# Patient Record
Sex: FEMALE | Race: WHITE | NOT HISPANIC OR LATINO
[De-identification: names, ages, dates, MRNs, and addresses within clinical notes are randomized per-mention and may not be internally consistent; named-entity substitution may affect disease eponyms.]

---

## 2022-06-03 ENCOUNTER — TRANSCRIPTION ENCOUNTER (OUTPATIENT)
Age: 87
End: 2022-06-03

## 2023-10-17 PROBLEM — Z00.00 ENCOUNTER FOR PREVENTIVE HEALTH EXAMINATION: Status: ACTIVE | Noted: 2023-10-17

## 2023-10-19 DIAGNOSIS — I35.0 NONRHEUMATIC AORTIC (VALVE) STENOSIS: ICD-10-CM

## 2023-10-19 DIAGNOSIS — Z86.39 PERSONAL HISTORY OF OTHER ENDOCRINE, NUTRITIONAL AND METABOLIC DISEASE: ICD-10-CM

## 2023-10-20 ENCOUNTER — APPOINTMENT (OUTPATIENT)
Dept: CARDIOTHORACIC SURGERY | Facility: CLINIC | Age: 88
End: 2023-10-20
Payer: MEDICARE

## 2023-10-20 VITALS
DIASTOLIC BLOOD PRESSURE: 76 MMHG | OXYGEN SATURATION: 98 % | WEIGHT: 140 LBS | HEART RATE: 74 BPM | BODY MASS INDEX: 25.76 KG/M2 | RESPIRATION RATE: 17 BRPM | HEIGHT: 62 IN | SYSTOLIC BLOOD PRESSURE: 122 MMHG

## 2023-10-20 PROCEDURE — 99204 OFFICE O/P NEW MOD 45 MIN: CPT

## 2023-11-02 ENCOUNTER — RESULT REVIEW (OUTPATIENT)
Age: 88
End: 2023-11-02

## 2023-12-15 ENCOUNTER — APPOINTMENT (OUTPATIENT)
Dept: CARDIOTHORACIC SURGERY | Facility: CLINIC | Age: 88
End: 2023-12-15
Payer: MEDICARE

## 2023-12-15 VITALS
WEIGHT: 140 LBS | DIASTOLIC BLOOD PRESSURE: 70 MMHG | OXYGEN SATURATION: 96 % | HEART RATE: 60 BPM | BODY MASS INDEX: 25.76 KG/M2 | SYSTOLIC BLOOD PRESSURE: 120 MMHG | HEIGHT: 62 IN

## 2023-12-15 PROCEDURE — 99214 OFFICE O/P EST MOD 30 MIN: CPT

## 2023-12-25 ENCOUNTER — NON-APPOINTMENT (OUTPATIENT)
Age: 88
End: 2023-12-25

## 2023-12-25 NOTE — PHYSICAL EXAM
[Well Developed] : well developed [Well Nourished] : well nourished [No Acute Distress] : no acute distress [Normal Conjunctiva] : normal conjunctiva [Normal Venous Pressure] : normal venous pressure [No Carotid Bruit] : no carotid bruit [No Rub] : no rub [No Gallop] : no gallop [Clear Lung Fields] : clear lung fields [Good Air Entry] : good air entry [No Respiratory Distress] : no respiratory distress  [Soft] : abdomen soft [Non Tender] : non-tender [No Masses/organomegaly] : no masses/organomegaly [Normal Bowel Sounds] : normal bowel sounds [Normal Gait] : normal gait [No Edema] : no edema [No Cyanosis] : no cyanosis [No Clubbing] : no clubbing [No Varicosities] : no varicosities [No Rash] : no rash [No Skin Lesions] : no skin lesions [Moves all extremities] : moves all extremities [No Focal Deficits] : no focal deficits [Normal Speech] : normal speech [Alert and Oriented] : alert and oriented [Normal memory] : normal memory [de-identified] : nml s1, dim s2, 3/6 RACHELE

## 2023-12-25 NOTE — ASSESSMENT
[FreeTextEntry1] : 89 Y female with PHM of HTN, HLD, thoracic aneurysm, OA, HFpEF, mod MR, severe TR and severe aortic stenosis (TTE 5/2023: LVEF 60%, MG 43.30, CRYSTAL 1.0), who presents with BENJAMIN/presyncope. NYHA II. +RLE chronic wound s/p recent course of abx. TTE 5/23 showed moderately dilated RA/RV, nml RV funciton, LVEF 60%, severe AS with peak/mean gradient 85/45mmHg, moderate MR, MAC, severe TR, PASP 75mmHg, mild-moderate PI. Patient is high risk for SAVR. Discussed risks/benefits/alternatives to treat of severe aortic stenosis including medical optimization with progression of symptoms, high risk SAVR, and TAVR with risk of mortality, CVA, vascular complications/bleeding, need for PPM, paravalvular leak, etc. All questions were answered. + hypervascular liver lesions, CAD, tortuous aorta.  -LHC prior to TAVR, +/- PCI potentially staged TAVR; tortuous aorta with plan for [facility] -CT surgical evaluation -redicussion timing with pt/family given recent presyncope understanding  entered into hospice (urgency present given sx) -continue GDMT -MRI abd per PMD -care per referring cardiologist

## 2023-12-25 NOTE — HISTORY OF PRESENT ILLNESS
[FreeTextEntry1] : 89 Y female with PHM of HTN, HLD, thoracic aneurysm, OA, HFpEF, mod MR, severe TR and severe aortic stenosis (TTE 5/2023: LVEF 60%, MG 43.30, CRYSTAL 1.0) who presents for follow up after testing.  Patient reports exertional dyspnea with minimal exertion. Reports recent episode of lightheadedness/dizzyness while sitting at dining room table; denies jory syncope. No prior events. Denies palpitations, chest pain, pnd, orthopnea.  Of note, patient was seen by her PCP, Dr. Duane Betancourt, on 10/3/23 for non-healing RLE wound (trauma 7/23), was started on Keflex and referred to wound care.   TAVR CTs on 11/3/2023- 4.1 cm aortic root aneurysm. 4.1 cm ascending thoracic aortic aneurysm.Severe atherosclerotic calcifications. Markedly tortuous abdominal aorta with two adjacent 90 degree kinks. 1.2 cm vascular malformation in hepatic segment 2 as discussed above. Inaddition, there are several hypervascular foci in hepatic segments 6 and 7 which may represent hypervascular liver lesions versus additional vascularmalformations. A follow-up MRI with hepatic mass protocol/with and without IVcontrast is recommended. Dilated right and left main pulmonary arteries suggesting pulmonary arterial hypertension. Additional findings noted above.  CT Cardiac on 11/3/2023- 1.  Stenosis severity is indeterminate in the proximal to mid LAD, proximalLCX and OM1 and mid RCA2.  Remaining coronary segments appear normal3.  Trileaflet aortic valve with leaflet thickening and calcification  Carotid dopplers on 11/3/2023- IMPRESSION: Calcified plaque without duplex evidence of hemodynamically significant stenosis of either carotid artery.

## 2024-02-01 ENCOUNTER — LABORATORY RESULT (OUTPATIENT)
Age: 89
End: 2024-02-01

## 2024-02-02 LAB
BASOPHILS # BLD AUTO: 0.07 K/UL
BASOPHILS NFR BLD AUTO: 0.8 %
EOSINOPHIL # BLD AUTO: 0.14 K/UL
EOSINOPHIL NFR BLD AUTO: 1.6 %
HCT VFR BLD CALC: 40.4 %
HGB BLD-MCNC: 12.7 G/DL
IMM GRANULOCYTES NFR BLD AUTO: 0.4 %
LYMPHOCYTES # BLD AUTO: 1.13 K/UL
LYMPHOCYTES NFR BLD AUTO: 12.6 %
MAN DIFF?: NORMAL
MCHC RBC-ENTMCNC: 29.7 PG
MCHC RBC-ENTMCNC: 31.4 GM/DL
MCV RBC AUTO: 94.4 FL
MONOCYTES # BLD AUTO: 0.98 K/UL
MONOCYTES NFR BLD AUTO: 10.9 %
NEUTROPHILS # BLD AUTO: 6.64 K/UL
NEUTROPHILS NFR BLD AUTO: 73.7 %
PLATELET # BLD AUTO: 275 K/UL
RBC # BLD: 4.28 M/UL
RBC # FLD: 15.1 %
WBC # FLD AUTO: 9 K/UL

## 2024-02-05 ENCOUNTER — APPOINTMENT (OUTPATIENT)
Dept: CARDIOTHORACIC SURGERY | Facility: CLINIC | Age: 89
End: 2024-02-05
Payer: MEDICARE

## 2024-02-05 PROCEDURE — 99214 OFFICE O/P EST MOD 30 MIN: CPT

## 2024-02-05 PROCEDURE — 99203 OFFICE O/P NEW LOW 30 MIN: CPT

## 2024-02-07 NOTE — REVIEW OF SYSTEMS
[SOB] : shortness of breath [Dyspnea on exertion] : dyspnea during exertion [Negative] : Heme/Lymph [FreeTextEntry5] : syncope

## 2024-02-07 NOTE — HISTORY OF PRESENT ILLNESS
[FreeTextEntry1] :  This visit was provided via telehealth using real-time 2-way audio visual technology. The patient, VERONICA ROQUE, was located at home, [address] [place] [address] [address] [place], [place] [id number] , at the time of the visit. The provider was located at [address] [place] [address]. The patient, VERONICA ROQUE, Dr. Madelyn Scott and SAUL MCKEON all participated in the telehealth encounter. Verbal consent given on 02/05/2024 by VERONICA ROQUE.   89 Y female with PHM of HTN, HLD, thoracic aneurysm, OA, HFpEF, mod MR, severe TR and severe aortic stenosis (TTE 5/2023: LVEF 60%, MG 43.30, CRYSTAL 1.0) referred for surgical consultation of treatment options for severe aortic stenosis.   The patient was last seen by [facility] team on 12/15/2023 when she decided to wait for her procedure until February 2024. She presents for clinical reassessment and surgical consultation. Per her last visit, the patient will need LHC prior to TAVR +/- PCI staged TAVR; tortuous aorta with plan for [facility].  TAVR CTs on 11/3/2023- 4.1 cm aortic root aneurysm. 4.1 cm ascending thoracic aortic aneurysm.Severe atherosclerotic calcifications. Markedly tortuous abdominal aorta with two adjacent 90 degree kinks. 1.2 cm vascular malformation in hepatic segment 2 as discussed above. Inaddition, there are several hypervascular foci in hepatic segments 6 and 7 which may represent hypervascular liver lesions versus additional vascularmalformations. A follow-up MRI with hepatic mass protocol/with and without IVcontrast is recommended. Dilated right and left main pulmonary arteries suggesting pulmonary arterial hypertension. Additional findings noted above.  CT Cardiac on 11/3/2023- 1. Stenosis severity is indeterminate in the proximal to mid LAD, proximalLCX and OM1 and mid RCA2. Remaining coronary segments appear normal3. Trileaflet aortic valve with leaflet thickening and calcification  Carotid dopplers on 11/3/2023- IMPRESSION: Calcified plaque without duplex evidence of hemodynamically significant stenosis of either carotid artery.

## 2024-02-07 NOTE — ASSESSMENT
[FreeTextEntry1] : 89 Y female with PHM of HTN, HLD, thoracic aneurysm, OA, HFpEF, mod MR, severe TR and severe aortic stenosis (TTE 5/2023: LVEF 60%, MG 43.30, CRYSTAL 1.0), recently diagnosed with atrial fibrillation (currently on Xarelto) who presents for surgical consultation.   The patient is clinically stable, NYHA Class III. The procedure, LHC/TF TAVR, was discussed at length again with the patient and she again agrees to proceed.  The tortuosity of her descending aorta discussed in detail.  If difficulty with the valve advancement, will likely do LHC and defer TAVR.   Risk and benefits discussed, all questions answered to the best of my ability.   She was instructed to hold her Xarelto 48 hours prior to procedure.

## 2024-02-07 NOTE — PLAN
[TextEntry] : -Scheduled for LHC/TF TAVR janeen on 02/15/2024, NOAC instructions given to the patient

## 2024-02-09 NOTE — PLAN
[TextEntry] : 1) Scheduled for LHC/TF TAVR on 02/15/2024, NOAC instructions given to the patient   I, Dr. Cale Bowling, personally performed the evaluation and management (E/M) services for this established patient who presents today with (a) new problem(s)/exacerbation of (an) existing condition(s). That E/M includes conducting the clinically appropriate interval history &/or exam, assessing all new/exacerbated conditions, and establishing a new plan of care. Today, my FLORESITA, noted herewith, was here to observe my evaluation and management service for this new problem/exacerbated condition and follow the plan of care established by me going forward.

## 2024-02-09 NOTE — ASSESSMENT
[FreeTextEntry1] : 89 Y female with PHM of HTN, HLD, thoracic aneurysm, OA, HFpEF, mod MR, severe TR and severe aortic stenosis (TTE 5/2023: LVEF 60%, MG 43.30, CRYSTAL 1.0), recently diagnosed with atrial fibrillation (currently on Xarelto) who presents for follow up.  The patient is clinically stable, NYHA Class III. The procedure, LHC/TF TAVR, was discussed at length again with the patient and she again agrees to proceed.  She was instructed to hold her Xarelto 48 hours prior to procedure. She was also evaluated by Dr. Madelyn Scott from [facility] who is in agreement to proceed with LHC/TF TAVR and deemed the patient high risk for SAVR given age, comorbidities, frailty.

## 2024-02-09 NOTE — HISTORY OF PRESENT ILLNESS
[FreeTextEntry1] : This visit was provided via telehealth using real-time 2-way audio visual technology. The patient, VERONICA ROQUE, was located at home, [address] [place] [address] DR [place], [place] [id number] , at the time of the visit. The provider was located at [address] [place] [address]. The patient, VERONICA ROQUE, Dr. Cale Bowling and SAUL MCKEON all participated in the telehealth encounter. Verbal consent given on 02/05/2024 by VERONICA ROQUE.   89 Y female with PHM of HTN, HLD, thoracic aneurysm, OA, HFpEF, mod MR, severe TR and severe aortic stenosis (TTE 5/2023: LVEF 60%, MG 43.30, CRYSTAL 1.0), recently diagnosed with atrial fibrillation (currently on Xarelto) who presents for follow up.  The patient was last seen on 12/15/2023 when she decided to wait for her procedure until February 2024.  She presents for clinical reassessment and surgical consultation.  Per her last visit, the patient will need LHC prior to TAVR +/- PCI staged TAVR; tortuous aorta with plan for [facility].    Patient reports exertional dyspnea with minimal exertion. Reports recent episode of lightheadedness/dizzyness while sitting at dining room table; denies jory syncope. No prior events. Denies palpitations, chest pain, pnd, orthopnea.  Of note, patient was seen by her PCP, Dr. Duane Betancourt, on 10/3/23 for non-healing RLE wound (trauma 7/23), was started on Keflex and referred to wound care.   TAVR CTs on 11/3/2023- 4.1 cm aortic root aneurysm. 4.1 cm ascending thoracic aortic aneurysm.Severe atherosclerotic calcifications. Markedly tortuous abdominal aorta with two adjacent 90 degree kinks. 1.2 cm vascular malformation in hepatic segment 2 as discussed above. Inaddition, there are several hypervascular foci in hepatic segments 6 and 7 which may represent hypervascular liver lesions versus additional vascularmalformations. A follow-up MRI with hepatic mass protocol/with and without IVcontrast is recommended. Dilated right and left main pulmonary arteries suggesting pulmonary arterial hypertension. Additional findings noted above.  CT Cardiac on 11/3/2023- 1.  Stenosis severity is indeterminate in the proximal to mid LAD, proximalLCX and OM1 and mid RCA2.  Remaining coronary segments appear normal3.  Trileaflet aortic valve with leaflet thickening and calcification  Carotid dopplers on 11/3/2023- IMPRESSION: Calcified plaque without duplex evidence of hemodynamically significant stenosis of either carotid artery.   Since her last visit, the patient continues to feel the same with fatigue and dyspnea.  She was recently diagnosed with Atrial Fibrillation on routine EKG with Dr. Mainor Patricia and started on Dilitazem and Xarelto.  The patient states she has had no adverse bleeding events to the Xarelto. She also denies any fever, chills, and denies any open wounds

## 2024-02-13 ENCOUNTER — NON-APPOINTMENT (OUTPATIENT)
Age: 89
End: 2024-02-13

## 2024-02-14 ENCOUNTER — TRANSCRIPTION ENCOUNTER (OUTPATIENT)
Age: 89
End: 2024-02-14

## 2024-02-14 VITALS
TEMPERATURE: 98 F | HEART RATE: 110 BPM | HEIGHT: 61 IN | DIASTOLIC BLOOD PRESSURE: 90 MMHG | RESPIRATION RATE: 18 BRPM | SYSTOLIC BLOOD PRESSURE: 133 MMHG | OXYGEN SATURATION: 97 % | WEIGHT: 136.91 LBS

## 2024-02-14 NOTE — PATIENT PROFILE ADULT - FALL HARM RISK - HARM RISK INTERVENTIONS

## 2024-02-15 ENCOUNTER — RESULT REVIEW (OUTPATIENT)
Age: 89
End: 2024-02-15

## 2024-02-15 ENCOUNTER — TRANSCRIPTION ENCOUNTER (OUTPATIENT)
Age: 89
End: 2024-02-15

## 2024-02-15 ENCOUNTER — APPOINTMENT (OUTPATIENT)
Dept: CARDIOTHORACIC SURGERY | Facility: HOSPITAL | Age: 89
End: 2024-02-15

## 2024-02-15 ENCOUNTER — INPATIENT (INPATIENT)
Facility: HOSPITAL | Age: 89
LOS: 1 days | Discharge: HOME HEALTH | DRG: 267 | End: 2024-02-17
Attending: INTERNAL MEDICINE | Admitting: INTERNAL MEDICINE
Payer: MEDICARE

## 2024-02-15 ENCOUNTER — NON-APPOINTMENT (OUTPATIENT)
Age: 89
End: 2024-02-15

## 2024-02-15 DIAGNOSIS — Z41.9 ENCOUNTER FOR PROCEDURE FOR PURPOSES OTHER THAN REMEDYING HEALTH STATE, UNSPECIFIED: Chronic | ICD-10-CM

## 2024-02-15 LAB
ALBUMIN SERPL ELPH-MCNC: 3.3 G/DL — SIGNIFICANT CHANGE UP (ref 3.3–5)
ALBUMIN SERPL ELPH-MCNC: 4.1 G/DL — SIGNIFICANT CHANGE UP (ref 3.3–5)
ALP SERPL-CCNC: 103 U/L — SIGNIFICANT CHANGE UP (ref 40–120)
ALP SERPL-CCNC: 85 U/L — SIGNIFICANT CHANGE UP (ref 40–120)
ALT FLD-CCNC: 20 U/L — SIGNIFICANT CHANGE UP (ref 10–45)
ALT FLD-CCNC: 24 U/L — SIGNIFICANT CHANGE UP (ref 10–45)
ANION GAP SERPL CALC-SCNC: 12 MMOL/L — SIGNIFICANT CHANGE UP (ref 5–17)
ANION GAP SERPL CALC-SCNC: 6 MMOL/L — SIGNIFICANT CHANGE UP (ref 5–17)
APTT BLD: 31.2 SEC — SIGNIFICANT CHANGE UP (ref 24.5–35.6)
APTT BLD: 33.4 SEC — SIGNIFICANT CHANGE UP (ref 24.5–35.6)
AST SERPL-CCNC: 23 U/L — SIGNIFICANT CHANGE UP (ref 10–40)
AST SERPL-CCNC: 30 U/L — SIGNIFICANT CHANGE UP (ref 10–40)
BILIRUB SERPL-MCNC: 0.6 MG/DL — SIGNIFICANT CHANGE UP (ref 0.2–1.2)
BILIRUB SERPL-MCNC: 0.7 MG/DL — SIGNIFICANT CHANGE UP (ref 0.2–1.2)
BLD GP AB SCN SERPL QL: NEGATIVE — SIGNIFICANT CHANGE UP
BUN SERPL-MCNC: 31 MG/DL — HIGH (ref 7–23)
BUN SERPL-MCNC: 37 MG/DL — HIGH (ref 7–23)
CALCIUM SERPL-MCNC: 8.5 MG/DL — SIGNIFICANT CHANGE UP (ref 8.4–10.5)
CALCIUM SERPL-MCNC: 9.6 MG/DL — SIGNIFICANT CHANGE UP (ref 8.4–10.5)
CHLORIDE SERPL-SCNC: 101 MMOL/L — SIGNIFICANT CHANGE UP (ref 96–108)
CHLORIDE SERPL-SCNC: 108 MMOL/L — SIGNIFICANT CHANGE UP (ref 96–108)
CO2 SERPL-SCNC: 22 MMOL/L — SIGNIFICANT CHANGE UP (ref 22–31)
CO2 SERPL-SCNC: 23 MMOL/L — SIGNIFICANT CHANGE UP (ref 22–31)
CREAT SERPL-MCNC: 0.84 MG/DL — SIGNIFICANT CHANGE UP (ref 0.5–1.3)
CREAT SERPL-MCNC: 0.98 MG/DL — SIGNIFICANT CHANGE UP (ref 0.5–1.3)
EGFR: 54 ML/MIN/1.73M2 — LOW
EGFR: 66 ML/MIN/1.73M2 — SIGNIFICANT CHANGE UP
GAS PNL BLDA: SIGNIFICANT CHANGE UP
GLUCOSE SERPL-MCNC: 105 MG/DL — HIGH (ref 70–99)
GLUCOSE SERPL-MCNC: 118 MG/DL — HIGH (ref 70–99)
HCT VFR BLD CALC: 29.7 % — LOW (ref 34.5–45)
HCT VFR BLD CALC: 36.6 % — SIGNIFICANT CHANGE UP (ref 34.5–45)
HGB BLD-MCNC: 11.9 G/DL — SIGNIFICANT CHANGE UP (ref 11.5–15.5)
HGB BLD-MCNC: 9.8 G/DL — LOW (ref 11.5–15.5)
INR BLD: 1.12 — SIGNIFICANT CHANGE UP (ref 0.85–1.18)
INR BLD: 1.29 — HIGH (ref 0.85–1.18)
MAGNESIUM SERPL-MCNC: 1.9 MG/DL — SIGNIFICANT CHANGE UP (ref 1.6–2.6)
MAGNESIUM SERPL-MCNC: 2.2 MG/DL — SIGNIFICANT CHANGE UP (ref 1.6–2.6)
MCHC RBC-ENTMCNC: 30.4 PG — SIGNIFICANT CHANGE UP (ref 27–34)
MCHC RBC-ENTMCNC: 30.8 PG — SIGNIFICANT CHANGE UP (ref 27–34)
MCHC RBC-ENTMCNC: 32.5 GM/DL — SIGNIFICANT CHANGE UP (ref 32–36)
MCHC RBC-ENTMCNC: 33 GM/DL — SIGNIFICANT CHANGE UP (ref 32–36)
MCV RBC AUTO: 93.4 FL — SIGNIFICANT CHANGE UP (ref 80–100)
MCV RBC AUTO: 93.4 FL — SIGNIFICANT CHANGE UP (ref 80–100)
NRBC # BLD: 0 /100 WBCS — SIGNIFICANT CHANGE UP (ref 0–0)
NRBC # BLD: 0 /100 WBCS — SIGNIFICANT CHANGE UP (ref 0–0)
PHOSPHATE SERPL-MCNC: 2.9 MG/DL — SIGNIFICANT CHANGE UP (ref 2.5–4.5)
PHOSPHATE SERPL-MCNC: 3.4 MG/DL — SIGNIFICANT CHANGE UP (ref 2.5–4.5)
PLATELET # BLD AUTO: 202 K/UL — SIGNIFICANT CHANGE UP (ref 150–400)
PLATELET # BLD AUTO: 278 K/UL — SIGNIFICANT CHANGE UP (ref 150–400)
POTASSIUM SERPL-MCNC: 3.9 MMOL/L — SIGNIFICANT CHANGE UP (ref 3.5–5.3)
POTASSIUM SERPL-MCNC: 4.2 MMOL/L — SIGNIFICANT CHANGE UP (ref 3.5–5.3)
POTASSIUM SERPL-SCNC: 3.9 MMOL/L — SIGNIFICANT CHANGE UP (ref 3.5–5.3)
POTASSIUM SERPL-SCNC: 4.2 MMOL/L — SIGNIFICANT CHANGE UP (ref 3.5–5.3)
PROT SERPL-MCNC: 5.6 G/DL — LOW (ref 6–8.3)
PROT SERPL-MCNC: 7.1 G/DL — SIGNIFICANT CHANGE UP (ref 6–8.3)
PROTHROM AB SERPL-ACNC: 12.7 SEC — SIGNIFICANT CHANGE UP (ref 9.5–13)
PROTHROM AB SERPL-ACNC: 14.6 SEC — HIGH (ref 9.5–13)
RBC # BLD: 3.18 M/UL — LOW (ref 3.8–5.2)
RBC # BLD: 3.92 M/UL — SIGNIFICANT CHANGE UP (ref 3.8–5.2)
RBC # FLD: 14.6 % — HIGH (ref 10.3–14.5)
RBC # FLD: 14.8 % — HIGH (ref 10.3–14.5)
RH IG SCN BLD-IMP: POSITIVE — SIGNIFICANT CHANGE UP
SODIUM SERPL-SCNC: 136 MMOL/L — SIGNIFICANT CHANGE UP (ref 135–145)
SODIUM SERPL-SCNC: 136 MMOL/L — SIGNIFICANT CHANGE UP (ref 135–145)
WBC # BLD: 5.62 K/UL — SIGNIFICANT CHANGE UP (ref 3.8–10.5)
WBC # BLD: 6.58 K/UL — SIGNIFICANT CHANGE UP (ref 3.8–10.5)
WBC # FLD AUTO: 5.62 K/UL — SIGNIFICANT CHANGE UP (ref 3.8–10.5)
WBC # FLD AUTO: 6.58 K/UL — SIGNIFICANT CHANGE UP (ref 3.8–10.5)

## 2024-02-15 PROCEDURE — 93306 TTE W/DOPPLER COMPLETE: CPT | Mod: 26

## 2024-02-15 PROCEDURE — 33361 REPLACE AORTIC VALVE PERQ: CPT | Mod: Q0,62

## 2024-02-15 PROCEDURE — 71045 X-RAY EXAM CHEST 1 VIEW: CPT | Mod: 26

## 2024-02-15 PROCEDURE — 93308 TTE F-UP OR LMTD: CPT | Mod: 26

## 2024-02-15 DEVICE — SHEATH INTRO DRYSEAL FLEX 14FR 33CM: Type: IMPLANTABLE DEVICE | Status: FUNCTIONAL

## 2024-02-15 DEVICE — SYS LOAD NAVITOR FLEXNAV LRG: Type: IMPLANTABLE DEVICE | Status: FUNCTIONAL

## 2024-02-15 DEVICE — BLLN Z-MED II 23MMX4X100CM: Type: IMPLANTABLE DEVICE | Status: FUNCTIONAL

## 2024-02-15 DEVICE — SUT PERCLOSE PROGLIDE 6FR: Type: IMPLANTABLE DEVICE | Status: FUNCTIONAL

## 2024-02-15 DEVICE — GWIRE ROSEN 0.035INX260CM: Type: IMPLANTABLE DEVICE | Status: FUNCTIONAL

## 2024-02-15 DEVICE — INTRO FLEXOR CHECK RAABE 8FR X 55CM: Type: IMPLANTABLE DEVICE | Status: FUNCTIONAL

## 2024-02-15 DEVICE — INTRODUCER SHEATH KIT GLIDESHEATH SLENDER FLEX STRAIGHT 21G 6F X 10CM: Type: IMPLANTABLE DEVICE | Status: FUNCTIONAL

## 2024-02-15 DEVICE — PACING CATH PACEL RIGHT HEART CURVE 5FR: Type: IMPLANTABLE DEVICE | Status: FUNCTIONAL

## 2024-02-15 DEVICE — CATH DX AL1 INFIN 5FRX100CM: Type: IMPLANTABLE DEVICE | Status: FUNCTIONAL

## 2024-02-15 DEVICE — SYS DEL NAVITOR FLEXNAV LRG: Type: IMPLANTABLE DEVICE | Status: FUNCTIONAL

## 2024-02-15 DEVICE — WIRE GD BMW UNIV II 300CM: Type: IMPLANTABLE DEVICE | Status: FUNCTIONAL

## 2024-02-15 DEVICE — WIRE GD CONFIDA BRECKER CRV .035X260: Type: IMPLANTABLE DEVICE | Status: FUNCTIONAL

## 2024-02-15 DEVICE — GUIDEWIRE TERUMO GLIDEWIRE STIFF STRAGHT .035" X 180CM: Type: IMPLANTABLE DEVICE | Status: FUNCTIONAL

## 2024-02-15 DEVICE — GWIRE JTIP 1.5MM .035X180CM: Type: IMPLANTABLE DEVICE | Status: FUNCTIONAL

## 2024-02-15 DEVICE — ANGIOSEAL VASC CLOS VIP 8FR: Type: IMPLANTABLE DEVICE | Status: FUNCTIONAL

## 2024-02-15 DEVICE — GUIDEWIRE LUNDERQUIST EXTRA-STIFF DOUBLE CURVED .035" X 260CM: Type: IMPLANTABLE DEVICE | Status: FUNCTIONAL

## 2024-02-15 DEVICE — SHEATH INTRODUCER TERUMO PINNACLE GUIDING 6FR X 45CM CROSS-CUT STRAIGHT: Type: IMPLANTABLE DEVICE | Status: FUNCTIONAL

## 2024-02-15 DEVICE — GWIRE GUID  0.035INX150CM: Type: IMPLANTABLE DEVICE | Status: FUNCTIONAL

## 2024-02-15 DEVICE — KIT PACE ELCTR BIPOLAR 5FR: Type: IMPLANTABLE DEVICE | Status: FUNCTIONAL

## 2024-02-15 DEVICE — BLLN Z-MED II 20MMX4X100CM: Type: IMPLANTABLE DEVICE | Status: FUNCTIONAL

## 2024-02-15 DEVICE — IMPLANTABLE DEVICE: Type: IMPLANTABLE DEVICE | Status: FUNCTIONAL

## 2024-02-15 DEVICE — CATH DX PIG 145 INFIN 5FRX110CM: Type: IMPLANTABLE DEVICE | Status: FUNCTIONAL

## 2024-02-15 DEVICE — CATH ANGIO GLIDECATH ANGLE 4FR X 120CM: Type: IMPLANTABLE DEVICE | Status: FUNCTIONAL

## 2024-02-15 DEVICE — GUIDEWIRE RADIFOCUS GLIDEWIRE ANGLED 0.035" X 260CM STIFF: Type: IMPLANTABLE DEVICE | Status: FUNCTIONAL

## 2024-02-15 DEVICE — CATH  INFINITI 5FR MULTIPACK: Type: IMPLANTABLE DEVICE | Status: FUNCTIONAL

## 2024-02-15 DEVICE — INTRO MICROPUNC 4FRX10CM SS: Type: IMPLANTABLE DEVICE | Status: FUNCTIONAL

## 2024-02-15 DEVICE — SHEATH INTRODUCER TERUMO PINNACLE CORONARY 8FR X 10CM X 0.038" MINI WIRE: Type: IMPLANTABLE DEVICE | Status: FUNCTIONAL

## 2024-02-15 DEVICE — CATH DX JL5 INFIN 5FRX100CM: Type: IMPLANTABLE DEVICE | Status: FUNCTIONAL

## 2024-02-15 RX ORDER — HEPARIN SODIUM 5000 [USP'U]/ML
800 INJECTION INTRAVENOUS; SUBCUTANEOUS
Qty: 25000 | Refills: 0 | Status: DISCONTINUED | OUTPATIENT
Start: 2024-02-15 | End: 2024-02-16

## 2024-02-15 RX ORDER — CEFAZOLIN SODIUM 1 G
2000 VIAL (EA) INJECTION EVERY 8 HOURS
Refills: 0 | Status: COMPLETED | OUTPATIENT
Start: 2024-02-15 | End: 2024-02-17

## 2024-02-15 RX ORDER — MORPHINE SULFATE 50 MG/1
2 CAPSULE, EXTENDED RELEASE ORAL ONCE
Refills: 0 | Status: DISCONTINUED | OUTPATIENT
Start: 2024-02-15 | End: 2024-02-15

## 2024-02-15 RX ORDER — PHENYLEPHRINE HYDROCHLORIDE 10 MG/ML
0.5 INJECTION INTRAVENOUS
Qty: 40 | Refills: 0 | Status: DISCONTINUED | OUTPATIENT
Start: 2024-02-15 | End: 2024-02-16

## 2024-02-15 RX ORDER — HEPARIN SODIUM 5000 [USP'U]/ML
5000 INJECTION INTRAVENOUS; SUBCUTANEOUS EVERY 8 HOURS
Refills: 0 | Status: DISCONTINUED | OUTPATIENT
Start: 2024-02-15 | End: 2024-02-15

## 2024-02-15 RX ORDER — LANOLIN ALCOHOL/MO/W.PET/CERES
5 CREAM (GRAM) TOPICAL AT BEDTIME
Refills: 0 | Status: DISCONTINUED | OUTPATIENT
Start: 2024-02-15 | End: 2024-02-17

## 2024-02-15 RX ORDER — ALBUMIN HUMAN 25 %
250 VIAL (ML) INTRAVENOUS ONCE
Refills: 0 | Status: COMPLETED | OUTPATIENT
Start: 2024-02-15 | End: 2024-02-15

## 2024-02-15 RX ORDER — OXYCODONE HYDROCHLORIDE 5 MG/1
5 TABLET ORAL EVERY 6 HOURS
Refills: 0 | Status: DISCONTINUED | OUTPATIENT
Start: 2024-02-15 | End: 2024-02-16

## 2024-02-15 RX ORDER — MAGNESIUM SULFATE 500 MG/ML
2 VIAL (ML) INJECTION ONCE
Refills: 0 | Status: COMPLETED | OUTPATIENT
Start: 2024-02-15 | End: 2024-02-15

## 2024-02-15 RX ORDER — ACETAMINOPHEN 500 MG
650 TABLET ORAL EVERY 6 HOURS
Refills: 0 | Status: DISCONTINUED | OUTPATIENT
Start: 2024-02-15 | End: 2024-02-17

## 2024-02-15 RX ORDER — PANTOPRAZOLE SODIUM 20 MG/1
40 TABLET, DELAYED RELEASE ORAL
Refills: 0 | Status: DISCONTINUED | OUTPATIENT
Start: 2024-02-15 | End: 2024-02-17

## 2024-02-15 RX ORDER — POLYETHYLENE GLYCOL 3350 17 G/17G
17 POWDER, FOR SOLUTION ORAL DAILY
Refills: 0 | Status: DISCONTINUED | OUTPATIENT
Start: 2024-02-15 | End: 2024-02-17

## 2024-02-15 RX ORDER — ATORVASTATIN CALCIUM 80 MG/1
10 TABLET, FILM COATED ORAL AT BEDTIME
Refills: 0 | Status: DISCONTINUED | OUTPATIENT
Start: 2024-02-15 | End: 2024-02-17

## 2024-02-15 RX ORDER — ACETAMINOPHEN 500 MG
1000 TABLET ORAL ONCE
Refills: 0 | Status: COMPLETED | OUTPATIENT
Start: 2024-02-15 | End: 2024-02-15

## 2024-02-15 RX ORDER — SENNA PLUS 8.6 MG/1
2 TABLET ORAL AT BEDTIME
Refills: 0 | Status: DISCONTINUED | OUTPATIENT
Start: 2024-02-15 | End: 2024-02-17

## 2024-02-15 RX ORDER — SODIUM CHLORIDE 9 MG/ML
500 INJECTION, SOLUTION INTRAVENOUS ONCE
Refills: 0 | Status: COMPLETED | OUTPATIENT
Start: 2024-02-15 | End: 2024-02-15

## 2024-02-15 RX ORDER — ASPIRIN/CALCIUM CARB/MAGNESIUM 324 MG
81 TABLET ORAL ONCE
Refills: 0 | Status: COMPLETED | OUTPATIENT
Start: 2024-02-15 | End: 2024-02-15

## 2024-02-15 RX ADMIN — SENNA PLUS 2 TABLET(S): 8.6 TABLET ORAL at 22:00

## 2024-02-15 RX ADMIN — Medication 25 GRAM(S): at 14:51

## 2024-02-15 RX ADMIN — Medication 1000 MILLIGRAM(S): at 12:00

## 2024-02-15 RX ADMIN — SODIUM CHLORIDE 1000 MILLILITER(S): 9 INJECTION, SOLUTION INTRAVENOUS at 18:30

## 2024-02-15 RX ADMIN — HEPARIN SODIUM 8 UNIT(S)/HR: 5000 INJECTION INTRAVENOUS; SUBCUTANEOUS at 22:01

## 2024-02-15 RX ADMIN — PANTOPRAZOLE SODIUM 40 MILLIGRAM(S): 20 TABLET, DELAYED RELEASE ORAL at 22:00

## 2024-02-15 RX ADMIN — Medication 100 MILLIGRAM(S): at 19:34

## 2024-02-15 RX ADMIN — PHENYLEPHRINE HYDROCHLORIDE 11.6 MICROGRAM(S)/KG/MIN: 10 INJECTION INTRAVENOUS at 13:13

## 2024-02-15 RX ADMIN — Medication 81 MILLIGRAM(S): at 07:37

## 2024-02-15 RX ADMIN — ATORVASTATIN CALCIUM 10 MILLIGRAM(S): 80 TABLET, FILM COATED ORAL at 22:00

## 2024-02-15 RX ADMIN — Medication 500 MILLILITER(S): at 20:00

## 2024-02-15 RX ADMIN — Medication 400 MILLIGRAM(S): at 11:30

## 2024-02-15 RX ADMIN — MORPHINE SULFATE 2 MILLIGRAM(S): 50 CAPSULE, EXTENDED RELEASE ORAL at 12:00

## 2024-02-15 RX ADMIN — OXYCODONE HYDROCHLORIDE 5 MILLIGRAM(S): 5 TABLET ORAL at 15:30

## 2024-02-15 RX ADMIN — MORPHINE SULFATE 2 MILLIGRAM(S): 50 CAPSULE, EXTENDED RELEASE ORAL at 12:15

## 2024-02-15 RX ADMIN — OXYCODONE HYDROCHLORIDE 5 MILLIGRAM(S): 5 TABLET ORAL at 14:51

## 2024-02-15 NOTE — CHART NOTE - NSCHARTNOTEFT_GEN_A_CORE
Access: Left groin 14 F. Perc closed x 2.  Angioseal x 1  Rt radial, 2nd access.  Remove ~3pm  TVP: Rt groin  Pre-existing rhythm issues:   Afib  Intra-op rhythm issues: Afib and wide QRS  Post-op rhythm issues:  Afib  TR Band: 3pm removal pending coags  Groin sites: Stable  Bed rest: keep bed rest for groin line  Anti-platelet: Eliquis tomorrow  Dispo:  Home candidate  TTE/EKG ordered  F/U post op labs/CXR pending

## 2024-02-15 NOTE — BRIEF OPERATIVE NOTE - COMMENTS
Dr. Scott was the first assistant for this case including but not limited to  obtaining access, LHC, and deployment of aortic valve     I was present for this procedure and participated as first assistant as described by the PA above, unless otherwise noted below.

## 2024-02-15 NOTE — H&P ADULT - ASSESSMENT
Assessment:    89 Y female with PHM of HTN, HLD, thoracic aneurysm, OA, HFpEF, mod MR, severe TR and severe aortic stenosis (TTE 5/2023: LVEF 60%, MG 43.30, CRYSTAL 1.0) referred for surgical consultation of treatment options for severe aortic stenosis. Ultimately deemed good candidate for TAVR.     Plan:  - Case discussed with Dr. Bowling  - Plan to proceed with TAVR today   - Post op EKG, labs, CXR, and TTE  - Resume home meds as indicated  - Admit under Dr. Bowling via same day surgery. Consent signed, placed on chart.  Risks/benefits reviewed, patient understands and agrees. T&S ordered and blood products placed on hold for OR.  To 9lach  post-op.   Assessment:    89 Y female with PHM of newly diagnosed AF (on eliquis but experiencing nose bleeds), HTN, HLD, thoracic aneurysm, OA, HFpEF, mod MR, severe TR and severe aortic stenosis (TTE 5/2023: LVEF 60%, MG 43.30, CRYSTAL 1.0) referred for surgical consultation of treatment options for severe aortic stenosis. Ultimately deemed good candidate for TAVR.     Plan:  - Case discussed with Dr. Bowling  - Plan to proceed with TAVR today   - Post op EKG, labs, CXR, and TTE  - Resume home meds as indicated  - Admit under Dr. Bowling via same day surgery. Consent signed, placed on chart.  Risks/benefits reviewed, patient understands and agrees. T&S ordered and blood products placed on hold for OR.  To 9lach  post-op.

## 2024-02-15 NOTE — PRE-ANESTHESIA EVALUATION ADULT - NSANTHOSAYNRD_GEN_A_CORE
No. KARYN screening performed.  STOP BANG Legend: 0-2 = LOW Risk; 3-4 = INTERMEDIATE Risk; 5-8 = HIGH Risk

## 2024-02-15 NOTE — H&P ADULT - HISTORY OF PRESENT ILLNESS
89 Y female with PHM of HTN, HLD, thoracic aneurysm, OA, HFpEF, mod MR, severe TR and severe aortic stenosis (TTE 5/2023: LVEF 60%, MG 43.30, CRYSTAL 1.0) referred for surgical consultation of treatment options for severe aortic stenosis. Ultimately deemed good candidate for TAVR.     The patient was last seen by [facility] team on 12/15/2023 when she decided to wait for her procedure until February 2024. She presents for clinical reassessment and surgical consultation. Per her last visit, the patient will need LHC prior to TAVR +/- PCI staged TAVR; tortuous aorta with plan for [facility].    TAVR CTs on 11/3/2023- 4.1 cm aortic root aneurysm. 4.1 cm ascending thoracic aortic aneurysm.Severe atherosclerotic calcifications. Markedly tortuous abdominal aorta with two adjacent 90 degree kinks. 1.2 cm vascular malformation in hepatic segment 2 as discussed above. Inaddition, there are several hypervascular foci in hepatic segments 6 and 7 which may represent hypervascular liver lesions versus additional vascularmalformations. A follow-up MRI with hepatic mass protocol/with and without IVcontrast is recommended. Dilated right and left main pulmonary arteries suggesting pulmonary arterial hypertension. Additional findings noted above.    CT Cardiac on 11/3/2023- 1. Stenosis severity is indeterminate in the proximal to mid LAD, proximalLCX and OM1 and mid RCA2. Remaining coronary segments appear normal3. Trileaflet aortic valve with leaflet thickening and calcification    Carotid dopplers on 11/3/2023- IMPRESSION: Calcified plaque without duplex evidence of hemodynamically significant stenosis of either carotid artery.     Patient seen in same day holding area; Reports no changes to PMHx or medications since last seen by our team. Denies acute or current SOB, chest pain, palpitation, N/V/D, fever/chills, recent illness, or any other concerning symptoms.   89 Y female with PHM of newly diagnosed AF (on eliquis but experiencing nose bleeds), HTN, HLD, thoracic aneurysm, OA, HFpEF, mod MR, severe TR and severe aortic stenosis (TTE 5/2023: LVEF 60%, MG 43.30, CRYSTAL 1.0) referred for surgical consultation of treatment options for severe aortic stenosis. Ultimately deemed good candidate for TAVR.     The patient was last seen by [facility] team on 12/15/2023 when she decided to wait for her procedure until February 2024. She presents for clinical reassessment and surgical consultation. Per her last visit, the patient will need LHC prior to TAVR +/- PCI staged TAVR; tortuous aorta with plan for [facility].    TAVR CTs on 11/3/2023- 4.1 cm aortic root aneurysm. 4.1 cm ascending thoracic aortic aneurysm.Severe atherosclerotic calcifications. Markedly tortuous abdominal aorta with two adjacent 90 degree kinks. 1.2 cm vascular malformation in hepatic segment 2 as discussed above. Inaddition, there are several hypervascular foci in hepatic segments 6 and 7 which may represent hypervascular liver lesions versus additional vascularmalformations. A follow-up MRI with hepatic mass protocol/with and without IVcontrast is recommended. Dilated right and left main pulmonary arteries suggesting pulmonary arterial hypertension. Additional findings noted above.    CT Cardiac on 11/3/2023- 1. Stenosis severity is indeterminate in the proximal to mid LAD, proximalLCX and OM1 and mid RCA2. Remaining coronary segments appear normal3. Trileaflet aortic valve with leaflet thickening and calcification    Carotid dopplers on 11/3/2023- IMPRESSION: Calcified plaque without duplex evidence of hemodynamically significant stenosis of either carotid artery.     Patient seen in same day holding area; Reports no changes to PMHx or medications since last seen by our team. Denies acute or current SOB, chest pain, palpitation, N/V/D, fever/chills, recent illness, or any other concerning symptoms.

## 2024-02-15 NOTE — BRIEF OPERATIVE NOTE - OPERATION/FINDINGS
27 mm Abbott[employer]i valve   Rhythm: NSR, intermittent AF    Access:  Right femoral artery - 14 F perc close x2 and angioseal  Left femoral artery - 6 F - perc close x1  Left femoral vein - 6 f - manual pressure 27 mm AbbottNavi valve   Rhythm: NSR, intermittent AF    Access:  Left femoral artery - 14 F perc close x2 and angioseal  Right femoral vein - 6 F - TVP in place  RRA 6 Fr- TR band

## 2024-02-15 NOTE — H&P ADULT - NSICDXPASTMEDICALHX_GEN_ALL_CORE_FT
PAST MEDICAL HISTORY:  (HFpEF) heart failure with preserved ejection fraction     Aneurysm, thoracic aortic     Aortic stenosis, severe     Fibrillation, atrial     HLD (hyperlipidemia)     HTN (hypertension)     Mitral regurgitation moderate    Severe tricuspid valve regurgitation

## 2024-02-15 NOTE — BRIEF OPERATIVE NOTE - NSICDXBRIEFPROCEDURE_GEN_ALL_CORE_FT
PROCEDURES:  TAVR, percutaneous 15-Feb-2024 10:26:28 (27 mm John valve), [facility], Ef 60% Maryam Chávez

## 2024-02-15 NOTE — H&P ADULT - NSHPREVIEWOFSYSTEMS_GEN_ALL_CORE
Review of Systems  CONSTITUTIONAL:  Denies fevers / chills, sweats, fatigue, weight loss, weight gain                                      NEURO:  Denies paresthesias, seizures, syncope, confusion                                                                                EYES:  Denies blurry vision, discharge, pain, loss of vision                                                                                    ENMT:  Denies difficulty hearing, vertigo, dysphagia, epistaxis, recent dental work                                       CV:  Denies chest pain, palpitations, BENJAMIN, orthopnea                                                                                          RESPIRATORY:  Denies wheezing, SOB, cough / sputum, hemoptysis                                                                GI:  Denies nausea, vomiting, diarrhea, constipation, melena, difficulty swallowing                                             : Denies hematuria, dysuria, urgency, incontinence                                                                                         MUSCULOSKELETAL:  Denies arthritis, joint swelling, muscle weakness                                                             SKIN/BREAST:  Denies rash, itching, hair loss, masses                                                                                            PSYCH:  Denies depression, anxiety, suicidal ideation                                                                                               HEME/LYMPH:  Denies bruises easily, enlarged lymph nodes, tender lymph nodes                                        ENDOCRINE:  Denies cold intolerance, heat intolerance, polydipsia

## 2024-02-15 NOTE — H&P ADULT - NSHPSOCIALHISTORY_GEN_ALL_CORE
Social History  Smoker:   YES / NO       Pack Years:       When Quit:  ETOH Use:   YES / NO   Frequency / Quantity:  Ilicit Drug Use:   YES / NO  Occupation:  Assistant Devices:   None / Walker / Cane  Lives with: Social History  Smoker:   NO       ETOH Use:   NO     Ilicit Drug Use:   NO  Occupation: N/A  Assistant Devices:   None   Lives with: Alone

## 2024-02-15 NOTE — H&P ADULT - NSHPPHYSICALEXAM_GEN_ALL_CORE
Physical Exam  CONSTITUTIONAL:                                                              WNL  NEURO:                                                                       WNL                      EYES:                                                                                WNL  ENMT:                                                                               WNL  CV:                                                                                   WNL  RESPIRATORY:                                                                 WNL  GI:                                                                                     WNL  : MARTINES + / -                                                                  WNL  MUSKULOSKELETAL:                                                       WNL  SKIN / BREAST:                                                                  WNL Physical Exam  CONSTITUTIONAL: well appearing, NAD  NEURO:  A&OX3, no focal deficits                   EYES: PERRLA  ENMT: Neck supple   CV: RRR, +murmur, no rubs/ gallops   RESPIRATORY:  CTA bilateral posterior lung fields   GI: +BS, NT/ND  : No sim   MUSKULOSKELETAL: +mild LE edema  SKIN / BREAST: No rashes/ abrasions noted

## 2024-02-16 ENCOUNTER — RESULT REVIEW (OUTPATIENT)
Age: 89
End: 2024-02-16

## 2024-02-16 ENCOUNTER — TRANSCRIPTION ENCOUNTER (OUTPATIENT)
Age: 89
End: 2024-02-16

## 2024-02-16 LAB
ALBUMIN SERPL ELPH-MCNC: 3.4 G/DL — SIGNIFICANT CHANGE UP (ref 3.3–5)
ALP SERPL-CCNC: 85 U/L — SIGNIFICANT CHANGE UP (ref 40–120)
ALT FLD-CCNC: 21 U/L — SIGNIFICANT CHANGE UP (ref 10–45)
ANION GAP SERPL CALC-SCNC: 9 MMOL/L — SIGNIFICANT CHANGE UP (ref 5–17)
APTT BLD: 55.4 SEC — HIGH (ref 24.5–35.6)
APTT BLD: 67.4 SEC — HIGH (ref 24.5–35.6)
APTT BLD: 69.5 SEC — HIGH (ref 24.5–35.6)
AST SERPL-CCNC: 29 U/L — SIGNIFICANT CHANGE UP (ref 10–40)
BILIRUB SERPL-MCNC: 0.6 MG/DL — SIGNIFICANT CHANGE UP (ref 0.2–1.2)
BUN SERPL-MCNC: 26 MG/DL — HIGH (ref 7–23)
CALCIUM SERPL-MCNC: 8.8 MG/DL — SIGNIFICANT CHANGE UP (ref 8.4–10.5)
CHLORIDE SERPL-SCNC: 104 MMOL/L — SIGNIFICANT CHANGE UP (ref 96–108)
CO2 SERPL-SCNC: 21 MMOL/L — LOW (ref 22–31)
CREAT SERPL-MCNC: 0.79 MG/DL — SIGNIFICANT CHANGE UP (ref 0.5–1.3)
EGFR: 71 ML/MIN/1.73M2 — SIGNIFICANT CHANGE UP
GAS PNL BLDA: SIGNIFICANT CHANGE UP
GLUCOSE SERPL-MCNC: 97 MG/DL — SIGNIFICANT CHANGE UP (ref 70–99)
HCT VFR BLD CALC: 30.3 % — LOW (ref 34.5–45)
HGB BLD-MCNC: 9.8 G/DL — LOW (ref 11.5–15.5)
INR BLD: 1.2 — HIGH (ref 0.85–1.18)
MAGNESIUM SERPL-MCNC: 2.3 MG/DL — SIGNIFICANT CHANGE UP (ref 1.6–2.6)
MCHC RBC-ENTMCNC: 30.4 PG — SIGNIFICANT CHANGE UP (ref 27–34)
MCHC RBC-ENTMCNC: 32.3 GM/DL — SIGNIFICANT CHANGE UP (ref 32–36)
MCV RBC AUTO: 94.1 FL — SIGNIFICANT CHANGE UP (ref 80–100)
NRBC # BLD: 0 /100 WBCS — SIGNIFICANT CHANGE UP (ref 0–0)
PHOSPHATE SERPL-MCNC: 2.8 MG/DL — SIGNIFICANT CHANGE UP (ref 2.5–4.5)
PLATELET # BLD AUTO: 186 K/UL — SIGNIFICANT CHANGE UP (ref 150–400)
POTASSIUM SERPL-MCNC: 4.3 MMOL/L — SIGNIFICANT CHANGE UP (ref 3.5–5.3)
POTASSIUM SERPL-SCNC: 4.3 MMOL/L — SIGNIFICANT CHANGE UP (ref 3.5–5.3)
PROT SERPL-MCNC: 5.4 G/DL — LOW (ref 6–8.3)
PROTHROM AB SERPL-ACNC: 13.6 SEC — HIGH (ref 9.5–13)
RBC # BLD: 3.22 M/UL — LOW (ref 3.8–5.2)
RBC # FLD: 14.6 % — HIGH (ref 10.3–14.5)
SODIUM SERPL-SCNC: 134 MMOL/L — LOW (ref 135–145)
WBC # BLD: 5.52 K/UL — SIGNIFICANT CHANGE UP (ref 3.8–10.5)
WBC # FLD AUTO: 5.52 K/UL — SIGNIFICANT CHANGE UP (ref 3.8–10.5)

## 2024-02-16 PROCEDURE — 93010 ELECTROCARDIOGRAM REPORT: CPT

## 2024-02-16 PROCEDURE — 99232 SBSQ HOSP IP/OBS MODERATE 35: CPT

## 2024-02-16 PROCEDURE — 93308 TTE F-UP OR LMTD: CPT | Mod: 26

## 2024-02-16 PROCEDURE — 71045 X-RAY EXAM CHEST 1 VIEW: CPT | Mod: 26

## 2024-02-16 RX ORDER — DILTIAZEM HCL 120 MG
30 CAPSULE, EXT RELEASE 24 HR ORAL EVERY 6 HOURS
Refills: 0 | Status: DISCONTINUED | OUTPATIENT
Start: 2024-02-16 | End: 2024-02-17

## 2024-02-16 RX ORDER — APIXABAN 2.5 MG/1
2.5 TABLET, FILM COATED ORAL EVERY 12 HOURS
Refills: 0 | Status: DISCONTINUED | OUTPATIENT
Start: 2024-02-16 | End: 2024-02-17

## 2024-02-16 RX ORDER — SODIUM CHLORIDE 9 MG/ML
3 INJECTION INTRAMUSCULAR; INTRAVENOUS; SUBCUTANEOUS EVERY 8 HOURS
Refills: 0 | Status: DISCONTINUED | OUTPATIENT
Start: 2024-02-16 | End: 2024-02-17

## 2024-02-16 RX ADMIN — Medication 650 MILLIGRAM(S): at 20:05

## 2024-02-16 RX ADMIN — Medication 100 MILLIGRAM(S): at 09:48

## 2024-02-16 RX ADMIN — POLYETHYLENE GLYCOL 3350 17 GRAM(S): 17 POWDER, FOR SOLUTION ORAL at 16:26

## 2024-02-16 RX ADMIN — APIXABAN 2.5 MILLIGRAM(S): 2.5 TABLET, FILM COATED ORAL at 18:30

## 2024-02-16 RX ADMIN — SENNA PLUS 2 TABLET(S): 8.6 TABLET ORAL at 21:32

## 2024-02-16 RX ADMIN — PANTOPRAZOLE SODIUM 40 MILLIGRAM(S): 20 TABLET, DELAYED RELEASE ORAL at 06:32

## 2024-02-16 RX ADMIN — Medication 650 MILLIGRAM(S): at 09:30

## 2024-02-16 RX ADMIN — Medication 30 MILLIGRAM(S): at 23:36

## 2024-02-16 RX ADMIN — ATORVASTATIN CALCIUM 10 MILLIGRAM(S): 80 TABLET, FILM COATED ORAL at 21:33

## 2024-02-16 RX ADMIN — Medication 30 MILLIGRAM(S): at 08:32

## 2024-02-16 RX ADMIN — Medication 650 MILLIGRAM(S): at 08:28

## 2024-02-16 RX ADMIN — Medication 30 MILLIGRAM(S): at 18:30

## 2024-02-16 RX ADMIN — Medication 100 MILLIGRAM(S): at 02:51

## 2024-02-16 RX ADMIN — Medication 650 MILLIGRAM(S): at 00:52

## 2024-02-16 RX ADMIN — Medication 650 MILLIGRAM(S): at 02:30

## 2024-02-16 RX ADMIN — Medication 100 MILLIGRAM(S): at 18:30

## 2024-02-16 RX ADMIN — Medication 30 MILLIGRAM(S): at 12:46

## 2024-02-16 NOTE — DISCHARGE NOTE PROVIDER - NSDCCPTREATMENT_GEN_ALL_CORE_FT
PRINCIPAL PROCEDURE  Procedure: TAVR, percutaneous  Findings and Treatment: (27 mm John valve), LHC, Ef 60%     PRINCIPAL PROCEDURE  Procedure: TAVR, percutaneous  Findings and Treatment: (27 mm John valve), [facility], Ef 60%  You had a MCOT monitor (an external cardiac rhythm monitoring device) placed on your day of discharge.  This helps us monitor your heart while you are out of the hospital for 30 days after discharge. Should your heart go into an abnormal or dangerous rhythm you will receieve a call from the MCOT team and your Structural Heart team of Doctors and PA's will be notified.  1. Keep the monitor within 30 feet of you at all times.  2. When you feel any symptom (chest pain, dizziness, palpitations, weakness, fatigue or anything outside of your normal), press the “Record Symptoms” button on the main phone of your phone  3. Shower or exercise as normal whilewearing the MCOT Patch. Do not swim or take a bath. Patch is water-resistant, not waterproof  4. When the battery is low on the phone or on the device, use the supplied . The monitor will show a warning message when the battery is low.  5. Do not remove the patch from yourskin after you begin monitoring. With normal wear, each patch should last 5 days. To replace the patch follow instructions in the MCOT box with the Patch Guide  6. Any issues with the MCOT device or phone please call Customer Service at [phone].  7. If you have any other questions at all please call the Structural Heart office at [phone]

## 2024-02-16 NOTE — DISCHARGE NOTE PROVIDER - ATTENDING ATTESTATION STATEMENT
Pre-charting complete.     Tdap and flu?    Vaccine Information Statement(s) was given today. This has been reviewed, questions answered, and verbal consent given by Patient for injection(s) and administration of Influenza (Inactivated) and Tetanus/Diphtheria/Pertussis (Tdap).    1. Does the patient have a moderate to severe fever?  No  2. Has the patient had a serious reaction to a flu shot before?   No  3. Has the patient ever had Guillian Holderness Syndrome within 6 weeks of a previous flu shot?  No  4. Is the patient less than 6 months of age?  No    Patient is eligible to receive the vaccine based on all questions being answered as 'No'.    Patient tolerated without incident. See immunization grid for documentation.         I have personally seen and examined the patient. I have collaborated with and supervised the

## 2024-02-16 NOTE — DISCHARGE NOTE PROVIDER - CARE PROVIDERS DIRECT ADDRESSES
,[email or web address]

## 2024-02-16 NOTE — DISCHARGE NOTE PROVIDER - NSDCMRMEDTOKEN_GEN_ALL_CORE_FT
Cardizem  mg/24 hours oral capsule, extended release: 1 cap(s) orally once a day  Cozaar 50 mg oral tablet: 1 tab(s) orally once a day  Eliquis 5 mg oral tablet: 1 tab(s) orally 2 times a day  furosemide 40 mg oral tablet: 1 tab(s) orally once a day  Lipitor 10 mg oral tablet: 1 tab(s) orally once a day (at bedtime)  spironolactone 25 mg oral tablet: 1 tab(s) orally once a day   acetaminophen 325 mg oral tablet: 2 tab(s) orally every 6 hours As needed Mild Pain (1 - 3)  apixaban 2.5 mg oral tablet: 1 tab(s) orally every 12 hours  atorvastatin 10 mg oral tablet: 1 tab(s) orally once a day (at bedtime)  dilTIAZem 180 mg/24 hours oral capsule, extended release: 1 cap(s) orally once a day  furosemide 20 mg oral tablet: 1 tab(s) orally once a day  pantoprazole 40 mg oral delayed release tablet: 1 tab(s) orally once a day (before a meal)  senna leaf extract oral tablet: 2 tab(s) orally once a day (at bedtime)

## 2024-02-16 NOTE — DISCHARGE NOTE PROVIDER - PROVIDER TOKENS
PROVIDER:[TOKEN:[[id number]],FOLLOWUP:[1 week]],PROVIDER:[TOKEN:[[id number]],FOLLOWUP:[2 weeks]]

## 2024-02-16 NOTE — DISCHARGE NOTE PROVIDER - NSDCFUADDINST_GEN_ALL_CORE_FT
-Walk daily as tolerated and use your incentive spirometer 10 times every hour while you are awake.     -Please weigh yourself daily. If you notice over a 3 pound weight gain in 3 days, this is a sign you are likely retaining too much fluid. It is imperative you call our right away with unexplained rapid weight gain.      -Please continue to wear the compression stockings given to you in the hospital at home. This is a way to prevent fluid from building up in your legs.     -No driving or strenuous activity/exercise until cleared by your surgeon.    -Gently clean your incisions with unscented/antibacterial soap and water, pat dry.  You may leave them open to air.    -Call your doctor if you have shortness of breath, chest pain not relieved by pain medication, dizziness, fever >101.5, or increased redness or drainage from incisions.      You had a MCOT monitor (an external cardiac rhythm monitoring device) placed on your day of discharge.  This helps us monitor your heart while you are out of the hospital for 30 days after discharge. Should your heart go into an abnormal or dangerous rhythm you will receieve a call from the MCOT team and your Structural Heart team of Doctors and PA's will be notified.    1. Keep the monitor within 30 feet of you at all times.  2. When you feel any symptom (chest pain, dizziness, palpitations, weakness, fatigue or anything outside of your normal), press the “Record Symptoms” button on the main phone of your phone  3. Shower or exercise as normal whilewearing the MCOT Patch. Do not swim or take a bath. Patch is water-resistant, not waterproof  4. When the battery is low on the phone or on the device, use the supplied . The monitor will show a warning message when the battery is low.  5. Do not remove the patch from yourskin after you begin monitoring. With normal wear, each patch should last 5 days. To replace the patch follow instructions in the MCOT box with the Patch Guide  6. Any issues with the MCOT device or phone please call Customer Service at [phone].  7. If you have any other questions at all please call the Structural Heart office at [phone]

## 2024-02-16 NOTE — DISCHARGE NOTE PROVIDER - NSDCFUADDAPPT_GEN_ALL_CORE_FT
Regarding your followup appointments, our office will call with the scheduled dates and times, if you do not receive a call by 2/20 please call [phone].     Please make an appointment with your Orthopedist regarding your prior Bakers cyst

## 2024-02-16 NOTE — DISCHARGE NOTE PROVIDER - HOSPITAL COURSE
Patient discussed on morning rounds with  _____    Operation Date: 2/15: TAVR (27 mm Abbott John valve)    Primary Surgeon/Attending MD: Dr. Bowling    Referring Physician: Dr. Mainor Patricia  _ _ _ _ _ _ _ _ _ _ _ _  HOSPITAL COURSE:  89 Y female with PHM of newly diagnosed AF (on eliquis but experiencing nose bleeds), HTN, HLD, thoracic aneurysm, OA, HFpEF, mod MR, severe TR and severe aortic stenosis (TTE 5/2023: LVEF 60%, MG 43.30, CRYSTAL 1.0) referred for surgical consultation of treatment options for severe aortic stenosis. On 2/15/24 patient underwent a TAVR (27 mm Abbott John valve) with Dr. Bowling. Intra-op the patient's QRS widened and patient arrived to [facility] as a mini-ICU patient with a right groin TVP in place. On POD 1, patient was restarted on cardizem, qrs unchanged, afib rate controlled. TVP was removed. TTE negative for pericardial effusion and heparin gtt changed to Eliquis. On POD 2 ***** INCOMPLETE*****    _ _ _ _ _ _ _ _ _ _ _ _  DISCHARGE PHYSICAL EXAM:  General:  Neuro:  Cardio:  Pulm:  GI/:  Vascular:  Extremities:  Incisions:     MEDICATION DISCHARGE CHECKLIST      TAVR Valve:         Anticoagulation        [ X] NOAC: Eliquis 2.5mg BID 2/2 hx of afib   _ _ _ _ _ _ _ _ _ _ _ _  RELEVANT LABS/IMAGING:    _ _ _ _ _ _ _ _ _ _ _ _  CLINICAL FOLLOW UP NEEDS:     [ X] Home equipment: MCOT   _ _ _ _ _ _ _ _ _ _ _ _  Over 35 minutes was spent with the patient reviewing the discharge material including medications, follow up appointments, recovery, concerning symptoms, and how to contact their health care providers if they have questions   Patient discussed on morning rounds with  _____    Operation Date: 2/15: TAVR (27 mm Abbott John valve)    Primary Surgeon/Attending MD: Dr. Bowling    Referring Physician: Dr. Mainor Patricia  _ _ _ _ _ _ _ _ _ _ _ _  HOSPITAL COURSE:  89 Y female with PHM of newly diagnosed AF (on eliquis but experiencing nose bleeds), HTN, HLD, thoracic aneurysm, OA, HFpEF, mod MR, severe TR and severe aortic stenosis (TTE 5/2023: LVEF 60%, MG 43.30, CRYSTAL 1.0) referred for surgical consultation of treatment options for severe aortic stenosis. On 2/15/24 patient underwent a TAVR (27 mm Abbott John valve) with Dr. Bowling. Intra-op the patient's QRS widened and patient arrived to [facility] as a mini-ICU patient with a right groin TVP in place. On POD 1, patient was restarted on cardizem, qrs unchanged, afib rate controlled. TVP was removed. TTE negative for pericardial effusion and heparin gtt changed to Eliquis. On POD 2 ***** INCOMPLETE*****  _ _ _ _ _ _ _ _ _ _ _ _  DISCHARGE PHYSICAL EXAM:  General:  Neuro:  Cardio:  Pulm:  GI/:  Vascular:  Extremities:  Incisions:     MEDICATION DISCHARGE CHECKLIST      TAVR Valve:         Anticoagulation        [ X] NOAC: Eliquis 2.5mg BID 2/2 hx of afib   _ _ _ _ _ _ _ _ _ _ _ _  RELEVANT LABS/IMAGING:    _ _ _ _ _ _ _ _ _ _ _ _  CLINICAL FOLLOW UP NEEDS:     [ X] Home equipment: MCOT   _ _ _ _ _ _ _ _ _ _ _ _  Over 35 minutes was spent with the patient reviewing the discharge material including medications, follow up appointments, recovery, concerning symptoms, and how to contact their health care providers if they have questions   Patient discussed on morning rounds with Dr. Bowling     Operation Date: 2/15: TAVR (27 mm Abbott John valve)    Primary Surgeon/Attending MD: Dr. Bowling    Referring Physician: Dr. Mainor Patricia  _ _ _ _ _ _ _ _ _ _ _ _  HOSPITAL COURSE:  89 Y female with PHM of newly diagnosed AF (on eliquis but experiencing nose bleeds), HTN, HLD, thoracic aneurysm, OA, HFpEF, mod MR, severe TR and severe aortic stenosis (TTE 5/2023: LVEF 60%, MG 43.30, CRYSTAL 1.0) referred for surgical consultation of treatment options for severe aortic stenosis. On 2/15/24 patient underwent a TAVR (27 mm Abbott John valve) with Dr. Bowling. Intra-op the patient's QRS widened and patient arrived to [facility] as a mini-ICU patient with a right groin TVP in place. On POD 1, patient was restarted on cardizem, qrs unchanged, afib rate controlled. TVP was removed. TTE negative for pericardial effusion and heparin gtt changed to Eliquis. On POD 2 Pt complained of pain behind the knee. pain was significant to touch and pt discussed that she was recently diagnosed with a bakers cyst. Per Dr. Jolly, US of the lower extremity ordered to r/o DVT which was negative and showed a bakers cyst. Pt has an out pt Orthopedist who she will see. At time of discharge pt became tachycardic to the 130s, discussed with Dr. Bowling and plan was to give additional dose of cardizem (equaling her home dose of 180) monitor for an hour and then discharge home if HR remains in the low 100s. After an hour HR remained  and pt was deemed medically stable for discharge. At time of discharge there was no significant EKG changes, pt tolerated a PO diet, ambulated with assistance of a walker and had a post op BM. PT was discharged with a MCOT and is to not resume Losartan and spirolactone, started on lasix 20 mg QD. Will need a BMP at follow up due to hyperkalemia.   _ _ _ _ _ _ _ _ _ _ _ _  DISCHARGE PHYSICAL EXAM:  GEN: NAD, looks comfortable  Psych: Mood appropriate  Neuro: A&Ox3.  No focal deficits.  Moving all extremities.   HEENT: No obvious abnormalities  CV: S1S2, regular, no murmurs appreciated.  No carotid bruits.  No JVD  Lungs: Clear B/L.  No wheezing, rales or rhonchi  ABD: Soft, non-tender, non-distended.  +Bowel sounds  EXT: Warm and well perfused.  No peripheral edema noted  Musculoskeletal: Moving all extremities with normal ROM, no joint swelling  PV: Pedal pulses palpable  Incisions: bilateral groin sites are clean dry and intact without drainage or bleeding no hematoma formation      MEDICATION DISCHARGE CHECKLIST      TAVR Valve:       Anticoagulation        [ X] NOAC: Eliquis 2.5mg BID 2/2 hx of afib   _ _ _ _ _ _ _ _ _ _ _ _  RELEVANT LABS/IMAGING:  < from: US Duplex Venous Lower Ext Ltd, Right (02.17.24 @ 15:59) >    IMPRESSION:  No evidence of right lower extremity deep venous thrombosis.    Right popliteal fossa fluid collections/Baker's cysts.        --- End of Report ---        < end of copied text >    _ _ _ _ _ _ _ _ _ _ _ _  CLINICAL FOLLOW UP NEEDS:     [ X] Home equipment: MCOT     LABS:   -BMP at follow up for hyperkalemia   _ _ _ _ _ _ _ _ _ _ _ _  Over 35 minutes was spent with the patient reviewing the discharge material including medications, follow up appointments, recovery, concerning symptoms, and how to contact their health care providers if they have questions

## 2024-02-16 NOTE — DISCHARGE NOTE PROVIDER - CARE PROVIDER_API CALL
Cale Bowling  Interventional Cardiology  [address]  [place] [address]  Phone: [phone]  Fax: [phone]  Follow Up Time: 1 week    Mainor Patricia  Cardiovascular Disease  [address] [place] [id number]  Phone: [phone]  Fax: [phone]  Follow Up Time: 2 weeks

## 2024-02-16 NOTE — PROGRESS NOTE ADULT - SUBJECTIVE AND OBJECTIVE BOX
Patient discussed on morning rounds with Dr. Sctot     Operation / Date: 2/15: TAVR (27 mm Abbott John valve),     SUBJECTIVE ASSESSMENT:  89y Female assessed this AM. Bedrest 2/2 right groin TVP. No acute complaints. Denies cp/sob/n/v/fever/chills.        Vital Signs Last 24 Hrs  T(C): 36.4 (16 Feb 2024 09:48), Max: 37.1 (16 Feb 2024 06:10)  T(F): 97.6 (16 Feb 2024 09:48), Max: 98.8 (16 Feb 2024 06:10)  HR: 80 (16 Feb 2024 12:00) (74 - 123)  BP: --  BP(mean): --  RR: 18 (16 Feb 2024 13:00) (16 - 18)  SpO2: 98% (16 Feb 2024 12:00) (94% - 100%)    Parameters below as of 16 Feb 2024 13:00  Patient On (Oxygen Delivery Method): nasal cannula w/ humidification  O2 Flow (L/min): 2    I&O's Detail    15 Feb 2024 07:01  -  16 Feb 2024 07:00  --------------------------------------------------------  IN:    Heparin: 72 mL    IV PiggyBack: 100 mL    Lactated Ringers Bolus: 1000 mL    Oral Fluid: 620 mL    Phenylephrine: 15 mL  Total IN: 1807 mL    OUT:    Voided (mL): 700 mL  Total OUT: 700 mL    Total NET: 1107 mL      16 Feb 2024 07:01  -  16 Feb 2024 12:49  --------------------------------------------------------  IN:    Heparin: 16 mL    Oral Fluid: 100 mL  Total IN: 116 mL    OUT:    Voided (mL): 350 mL  Total OUT: 350 mL    Total NET: -234 mL        + Right groin TVP    PHYSICAL EXAM:  Appearance: No acute distress.  Neurologic: AAOx3, no AMS or focal deficits.  Responds appropriately to verbal and physical stimuli; exhibits purposeful movement in all extremities.  HEENT:   MMM, PERRLA  Neck: Supple  Cardiovascular: RRR, S1 S2. No m/r/g.  Respiratory: No acute respiratory distress. CTA b/l, no w/r/r.   Gastrointestinal:  Soft, non-tender, non-distended, + BS.	  Skin: No rashes. No ecchymoses. No cyanosis.  Extremities: Exhibits normal range of motion, no clubbing, cyanosis or edema.  Vascular: Peripheral pulses palpable 2+ bilaterally.  Incisions: bilateral groin incisions c/d/i.     LABS:                        9.8    5.52  )-----------( 186      ( 16 Feb 2024 03:28 )             30.3       COUMADIN: No    PT/INR - ( 16 Feb 2024 03:28 )   PT: 13.6 sec;   INR: 1.20          PTT - ( 16 Feb 2024 10:20 )  PTT:69.5 sec    02-16    134<L>  |  104  |  26<H>  ----------------------------<  97  4.3   |  21<L>  |  0.79    Ca    8.8      16 Feb 2024 03:28  Phos  2.8     02-16  Mg     2.3     02-16    TPro  5.4<L>  /  Alb  3.4  /  TBili  0.6  /  DBili  x   /  AST  29  /  ALT  21  /  AlkPhos  85  02-16      Urinalysis Basic - ( 16 Feb 2024 03:28 )    Color: x / Appearance: x / SG: x / pH: x  Gluc: 97 mg/dL / Ketone: x  / Bili: x / Urobili: x   Blood: x / Protein: x / Nitrite: x   Leuk Esterase: x / RBC: x / WBC x   Sq Epi: x / Non Sq Epi: x / Bacteria: x        MEDICATIONS  (STANDING):  atorvastatin 10 milliGRAM(s) Oral at bedtime  ceFAZolin   IVPB 2000 milliGRAM(s) IV Intermittent every 8 hours  diltiazem    Tablet 30 milliGRAM(s) Oral every 6 hours  heparin  Infusion 800 Unit(s)/Hr (8 mL/Hr) IV Continuous <Continuous>  pantoprazole    Tablet 40 milliGRAM(s) Oral before breakfast  phenylephrine    Infusion 0.5 MICROgram(s)/kG/Min (11.6 mL/Hr) IV Continuous <Continuous>  polyethylene glycol 3350 17 Gram(s) Oral daily  senna 2 Tablet(s) Oral at bedtime    MEDICATIONS  (PRN):  acetaminophen     Tablet .. 650 milliGRAM(s) Oral every 6 hours PRN Mild Pain (1 - 3)  melatonin 5 milliGRAM(s) Oral at bedtime PRN Insomnia  oxyCODONE    IR 5 milliGRAM(s) Oral every 6 hours PRN Moderate Pain (4 - 6)        RADIOLOGY & ADDITIONAL TESTS:    < from: Xray Chest 1 View- PORTABLE-Routine (Xray Chest 1 View- PORTABLE-Routine in AM.) (02.16.24 @ 05:30) >  Findings/  impression: Stable positioning of support device. Stable cardiomegaly,   thoracic aortic calcification, TAVR. Pulmonary vascular congestion/right   pleural effusion, decreased. Stable bony structures..    < end of copied text >  < from: TTE Echo Complete w/o Contrast w/ Doppler (02.15.24 @ 15:57) >  CONCLUSIONS:     1. Normal left ventricular size and systolic function.   2. Normal right ventricular size and systolic function.   3. Biatrial enlargement.   4. A transcatheter aortic valve (TAVR) is noted in the aortic position.   The peak transvalvular velocity is 1.48 m/s, the mean transvalvular   gradient is 5.00 mmHg, and the LVOT/AV velocity ratio is 0.71. There is   trace aortic regurgitation. The aortic regurgitation is paravalvular.   5. Moderate tricuspid regurgitation.   6. Mild-to-moderate mitral regurgitation.   7. Pulmonary artery systolic pressure is 69 mmHg.   8. No pericardial effusion.   9. No prior echo is available for comparison.    < end of copied text >

## 2024-02-16 NOTE — PROGRESS NOTE ADULT - ASSESSMENT
89 Y female with PHM of newly diagnosed AF (on eliquis but experiencing nose bleeds), HTN, HLD, thoracic aneurysm, OA, HFpEF, mod MR, severe TR and severe aortic stenosis (TTE 5/2023: LVEF 60%, MG 43.30, CRYSTAL 1.0) referred for surgical consultation of treatment options for severe aortic stenosis. On 2/15/24 patient underwent a TAVR (27 mm Abbott John valve) with Dr. Bowling. Intra-op the patient's QRS widened and patient arrived to [facility] as a mini-ICU patient with a right groin TVP in place. On POD 1, pt is in atrial fibrillation, QRS in 120s an pt HDS. Patient is on a heparin gtt and undergoing a TTE to r/u effusion.     Plan:    Neurovascular: Stable  -No delirium.   -No pain. PRN: tylenol and oxycodone     Cardiovascular: AS s/pTAVR (27 mm Abbott John valve) on 2/15  - Intra-op QRS widening; 124s this AM  - Cardizem 30mg q6hr   - Plan to d/c right groin TVP   - TTE to r/o pericardial effusion; if negative, plan to stop heparin and start eliquis   - hx afib: cardizem + eliquis plan as above  - Hx HTN: cardizem  - Hx HLD: lipitor 10   -Hemodynamically stable. HR controlled.  -Continue to monitor HR, BP, telemetry      Respiratory: stable  -Oxygenating well on RA   -Encourage coughing and use of IS 10x / hr while awake.  -CXR: small right opacity     GI: stable  -PPX: protonix  -PO Diet: regular  -Bowel regimen: miralax/senna    Renal / : stable  -Monitor renal function.  -Monitor I/O's.  -BUN/Cr: 26 & 0.79    Endocrine:  No hx dm or thyroid disease     Hematologic: stable  -H/H: 9.8 & 30.3  -Coagulation Panel: PT 13.6 aptt 67.4 INR 1.2    ID: stable  -Temperature: afebrile  -WBC: 5.5    Prophylaxis:  - heparin gtt  -Continue with SCD's    Disposition:  - d/c tomorrow with mcot

## 2024-02-17 ENCOUNTER — TRANSCRIPTION ENCOUNTER (OUTPATIENT)
Age: 89
End: 2024-02-17

## 2024-02-17 VITALS — TEMPERATURE: 99 F

## 2024-02-17 LAB
ANION GAP SERPL CALC-SCNC: 8 MMOL/L — SIGNIFICANT CHANGE UP (ref 5–17)
BASOPHILS # BLD AUTO: 0.04 K/UL — SIGNIFICANT CHANGE UP (ref 0–0.2)
BASOPHILS NFR BLD AUTO: 0.6 % — SIGNIFICANT CHANGE UP (ref 0–2)
BUN SERPL-MCNC: 18 MG/DL — SIGNIFICANT CHANGE UP (ref 7–23)
CALCIUM SERPL-MCNC: 8.9 MG/DL — SIGNIFICANT CHANGE UP (ref 8.4–10.5)
CHLORIDE SERPL-SCNC: 106 MMOL/L — SIGNIFICANT CHANGE UP (ref 96–108)
CO2 SERPL-SCNC: 22 MMOL/L — SIGNIFICANT CHANGE UP (ref 22–31)
CREAT SERPL-MCNC: 0.73 MG/DL — SIGNIFICANT CHANGE UP (ref 0.5–1.3)
EGFR: 78 ML/MIN/1.73M2 — SIGNIFICANT CHANGE UP
EOSINOPHIL # BLD AUTO: 0.1 K/UL — SIGNIFICANT CHANGE UP (ref 0–0.5)
EOSINOPHIL NFR BLD AUTO: 1.6 % — SIGNIFICANT CHANGE UP (ref 0–6)
GLUCOSE SERPL-MCNC: 117 MG/DL — HIGH (ref 70–99)
HCT VFR BLD CALC: 33 % — LOW (ref 34.5–45)
HGB BLD-MCNC: 10.8 G/DL — LOW (ref 11.5–15.5)
IMM GRANULOCYTES NFR BLD AUTO: 0.5 % — SIGNIFICANT CHANGE UP (ref 0–0.9)
LYMPHOCYTES # BLD AUTO: 0.7 K/UL — LOW (ref 1–3.3)
LYMPHOCYTES # BLD AUTO: 10.9 % — LOW (ref 13–44)
MAGNESIUM SERPL-MCNC: 2.1 MG/DL — SIGNIFICANT CHANGE UP (ref 1.6–2.6)
MCHC RBC-ENTMCNC: 31 PG — SIGNIFICANT CHANGE UP (ref 27–34)
MCHC RBC-ENTMCNC: 32.7 GM/DL — SIGNIFICANT CHANGE UP (ref 32–36)
MCV RBC AUTO: 94.8 FL — SIGNIFICANT CHANGE UP (ref 80–100)
MONOCYTES # BLD AUTO: 0.76 K/UL — SIGNIFICANT CHANGE UP (ref 0–0.9)
MONOCYTES NFR BLD AUTO: 11.9 % — SIGNIFICANT CHANGE UP (ref 2–14)
NEUTROPHILS # BLD AUTO: 4.77 K/UL — SIGNIFICANT CHANGE UP (ref 1.8–7.4)
NEUTROPHILS NFR BLD AUTO: 74.5 % — SIGNIFICANT CHANGE UP (ref 43–77)
NRBC # BLD: 0 /100 WBCS — SIGNIFICANT CHANGE UP (ref 0–0)
PLATELET # BLD AUTO: 176 K/UL — SIGNIFICANT CHANGE UP (ref 150–400)
POTASSIUM SERPL-MCNC: 4.7 MMOL/L — SIGNIFICANT CHANGE UP (ref 3.5–5.3)
POTASSIUM SERPL-SCNC: 4.7 MMOL/L — SIGNIFICANT CHANGE UP (ref 3.5–5.3)
RBC # BLD: 3.48 M/UL — LOW (ref 3.8–5.2)
RBC # FLD: 14.7 % — HIGH (ref 10.3–14.5)
SODIUM SERPL-SCNC: 136 MMOL/L — SIGNIFICANT CHANGE UP (ref 135–145)
WBC # BLD: 6.4 K/UL — SIGNIFICANT CHANGE UP (ref 3.8–10.5)
WBC # FLD AUTO: 6.4 K/UL — SIGNIFICANT CHANGE UP (ref 3.8–10.5)

## 2024-02-17 PROCEDURE — C1889: CPT

## 2024-02-17 PROCEDURE — 93005 ELECTROCARDIOGRAM TRACING: CPT

## 2024-02-17 PROCEDURE — 85610 PROTHROMBIN TIME: CPT

## 2024-02-17 PROCEDURE — 82330 ASSAY OF CALCIUM: CPT

## 2024-02-17 PROCEDURE — 93971 EXTREMITY STUDY: CPT

## 2024-02-17 PROCEDURE — 85730 THROMBOPLASTIN TIME PARTIAL: CPT

## 2024-02-17 PROCEDURE — 86901 BLOOD TYPING SEROLOGIC RH(D): CPT

## 2024-02-17 PROCEDURE — 85025 COMPLETE CBC W/AUTO DIFF WBC: CPT

## 2024-02-17 PROCEDURE — 83735 ASSAY OF MAGNESIUM: CPT

## 2024-02-17 PROCEDURE — 93308 TTE F-UP OR LMTD: CPT

## 2024-02-17 PROCEDURE — P9045: CPT

## 2024-02-17 PROCEDURE — 84100 ASSAY OF PHOSPHORUS: CPT

## 2024-02-17 PROCEDURE — 36415 COLL VENOUS BLD VENIPUNCTURE: CPT

## 2024-02-17 PROCEDURE — 82803 BLOOD GASES ANY COMBINATION: CPT

## 2024-02-17 PROCEDURE — 85027 COMPLETE CBC AUTOMATED: CPT

## 2024-02-17 PROCEDURE — C1725: CPT

## 2024-02-17 PROCEDURE — 93321 DOPPLER ECHO F-UP/LMTD STD: CPT

## 2024-02-17 PROCEDURE — 80048 BASIC METABOLIC PNL TOTAL CA: CPT

## 2024-02-17 PROCEDURE — 86900 BLOOD TYPING SEROLOGIC ABO: CPT

## 2024-02-17 PROCEDURE — 71045 X-RAY EXAM CHEST 1 VIEW: CPT

## 2024-02-17 PROCEDURE — 93971 EXTREMITY STUDY: CPT | Mod: 26,RT

## 2024-02-17 PROCEDURE — 93306 TTE W/DOPPLER COMPLETE: CPT

## 2024-02-17 PROCEDURE — L8699: CPT

## 2024-02-17 PROCEDURE — 84295 ASSAY OF SERUM SODIUM: CPT

## 2024-02-17 PROCEDURE — C1894: CPT

## 2024-02-17 PROCEDURE — 80053 COMPREHEN METABOLIC PANEL: CPT

## 2024-02-17 PROCEDURE — 97161 PT EVAL LOW COMPLEX 20 MIN: CPT

## 2024-02-17 PROCEDURE — C1760: CPT

## 2024-02-17 PROCEDURE — C1887: CPT

## 2024-02-17 PROCEDURE — 71045 X-RAY EXAM CHEST 1 VIEW: CPT | Mod: 26

## 2024-02-17 PROCEDURE — 84132 ASSAY OF SERUM POTASSIUM: CPT

## 2024-02-17 PROCEDURE — C1769: CPT

## 2024-02-17 PROCEDURE — 86923 COMPATIBILITY TEST ELECTRIC: CPT

## 2024-02-17 PROCEDURE — 86850 RBC ANTIBODY SCREEN: CPT

## 2024-02-17 RX ORDER — ACETAMINOPHEN 500 MG
2 TABLET ORAL
Qty: 0 | Refills: 0 | DISCHARGE
Start: 2024-02-17

## 2024-02-17 RX ORDER — DILTIAZEM HCL 120 MG
30 CAPSULE, EXT RELEASE 24 HR ORAL ONCE
Refills: 0 | Status: COMPLETED | OUTPATIENT
Start: 2024-02-17 | End: 2024-02-17

## 2024-02-17 RX ORDER — PANTOPRAZOLE SODIUM 20 MG/1
1 TABLET, DELAYED RELEASE ORAL
Qty: 30 | Refills: 0
Start: 2024-02-17 | End: 2024-03-17

## 2024-02-17 RX ORDER — LOSARTAN POTASSIUM 100 MG/1
1 TABLET, FILM COATED ORAL
Refills: 0 | DISCHARGE

## 2024-02-17 RX ORDER — DILTIAZEM HCL 120 MG
1 CAPSULE, EXT RELEASE 24 HR ORAL
Refills: 0 | DISCHARGE

## 2024-02-17 RX ORDER — SENNA PLUS 8.6 MG/1
2 TABLET ORAL
Qty: 60 | Refills: 0
Start: 2024-02-17 | End: 2024-03-17

## 2024-02-17 RX ORDER — FUROSEMIDE 40 MG
1 TABLET ORAL
Qty: 30 | Refills: 0
Start: 2024-02-17 | End: 2024-03-17

## 2024-02-17 RX ORDER — DILTIAZEM HCL 120 MG
1 CAPSULE, EXT RELEASE 24 HR ORAL
Qty: 30 | Refills: 0
Start: 2024-02-17 | End: 2024-03-17

## 2024-02-17 RX ORDER — APIXABAN 2.5 MG/1
1 TABLET, FILM COATED ORAL
Refills: 0 | DISCHARGE

## 2024-02-17 RX ORDER — APIXABAN 2.5 MG/1
1 TABLET, FILM COATED ORAL
Qty: 60 | Refills: 0
Start: 2024-02-17 | End: 2024-03-17

## 2024-02-17 RX ORDER — SPIRONOLACTONE 25 MG/1
1 TABLET, FILM COATED ORAL
Refills: 0 | DISCHARGE

## 2024-02-17 RX ORDER — ATORVASTATIN CALCIUM 80 MG/1
1 TABLET, FILM COATED ORAL
Refills: 0 | DISCHARGE

## 2024-02-17 RX ORDER — ATORVASTATIN CALCIUM 80 MG/1
1 TABLET, FILM COATED ORAL
Qty: 0 | Refills: 0 | DISCHARGE
Start: 2024-02-17

## 2024-02-17 RX ORDER — FUROSEMIDE 40 MG
1 TABLET ORAL
Refills: 0 | DISCHARGE

## 2024-02-17 RX ADMIN — APIXABAN 2.5 MILLIGRAM(S): 2.5 TABLET, FILM COATED ORAL at 17:06

## 2024-02-17 RX ADMIN — Medication 650 MILLIGRAM(S): at 10:36

## 2024-02-17 RX ADMIN — SODIUM CHLORIDE 3 MILLILITER(S): 9 INJECTION INTRAMUSCULAR; INTRAVENOUS; SUBCUTANEOUS at 13:58

## 2024-02-17 RX ADMIN — SODIUM CHLORIDE 3 MILLILITER(S): 9 INJECTION INTRAMUSCULAR; INTRAVENOUS; SUBCUTANEOUS at 05:25

## 2024-02-17 RX ADMIN — Medication 30 MILLIGRAM(S): at 17:06

## 2024-02-17 RX ADMIN — Medication 30 MILLIGRAM(S): at 05:10

## 2024-02-17 RX ADMIN — Medication 30 MILLIGRAM(S): at 11:14

## 2024-02-17 RX ADMIN — Medication 30 MILLIGRAM(S): at 17:46

## 2024-02-17 RX ADMIN — Medication 100 MILLIGRAM(S): at 01:11

## 2024-02-17 RX ADMIN — Medication 650 MILLIGRAM(S): at 11:30

## 2024-02-17 RX ADMIN — PANTOPRAZOLE SODIUM 40 MILLIGRAM(S): 20 TABLET, DELAYED RELEASE ORAL at 07:27

## 2024-02-17 RX ADMIN — APIXABAN 2.5 MILLIGRAM(S): 2.5 TABLET, FILM COATED ORAL at 05:10

## 2024-02-17 NOTE — DISCHARGE NOTE NURSING/CASE MANAGEMENT/SOCIAL WORK - PATIENT PORTAL LINK FT
You can access the FollowMyHealth Patient Portal offered by [facility] by registering at the following website: [email or web address]. By joining [employer]’s FollowMyHealth portal, you will also be able to view your health information using other applications (apps) compatible with our system.

## 2024-02-17 NOTE — DISCHARGE NOTE NURSING/CASE MANAGEMENT/SOCIAL WORK - NSDCFUADDAPPT_GEN_ALL_CORE_FT
Regarding your followup appointments, our office will call with the scheduled dates and times, if you do not receive a call by 2/20 please call [phone].     Please make an appointment with your Orthopedist regarding your prior Bakers cyst

## 2024-02-17 NOTE — PHYSICAL THERAPY INITIAL EVALUATION ADULT - ADDITIONAL COMMENTS
Patient lives alone in apartment with 5 steps to enter and 5 steps inside. Doesn't use any Assistive Devices at baseline. Owns RW, SC, commode. Patient's daughters will stay with patient 24/7 upon discharge for 2 weeks. Denies recent falls.

## 2024-02-17 NOTE — PHYSICAL THERAPY INITIAL EVALUATION ADULT - GENERAL OBSERVATIONS, REHAB EVAL
Patient received semi-selby in bed  in NAD on RA, +SCDs, +Heplock, +Telemetry. Cleared by NIYAH Carlos. Agreeable to PT.

## 2024-02-17 NOTE — PHYSICAL THERAPY INITIAL EVALUATION ADULT - PERTINENT HX OF CURRENT PROBLEM, REHAB EVAL
89 Y female with PHM of newly diagnosed AF (on eliquis but experiencing nose bleeds), HTN, HLD, thoracic aneurysm, OA, HFpEF, mod MR, severe TR and severe aortic stenosis (TTE 5/2023: LVEF 60%, MG 43.30, CRYSTAL 1.0) referred for surgical consultation of treatment options for severe aortic stenosis. Now s/p TAVR.

## 2024-02-18 ENCOUNTER — NON-APPOINTMENT (OUTPATIENT)
Age: 89
End: 2024-02-18

## 2024-02-19 ENCOUNTER — TRANSCRIPTION ENCOUNTER (OUTPATIENT)
Age: 89
End: 2024-02-19

## 2024-02-19 ENCOUNTER — LABORATORY RESULT (OUTPATIENT)
Age: 89
End: 2024-02-19

## 2024-02-20 ENCOUNTER — APPOINTMENT (OUTPATIENT)
Dept: CARE COORDINATION | Facility: HOME HEALTH | Age: 89
End: 2024-02-20

## 2024-02-20 PROBLEM — I35.0 NONRHEUMATIC AORTIC (VALVE) STENOSIS: Chronic | Status: ACTIVE | Noted: 2024-02-14

## 2024-02-20 PROBLEM — I71.20 THORACIC AORTIC ANEURYSM, WITHOUT RUPTURE, UNSPECIFIED: Chronic | Status: ACTIVE | Noted: 2024-02-14

## 2024-02-20 PROBLEM — E78.5 HYPERLIPIDEMIA, UNSPECIFIED: Chronic | Status: ACTIVE | Noted: 2024-02-14

## 2024-02-20 PROBLEM — I07.1 RHEUMATIC TRICUSPID INSUFFICIENCY: Chronic | Status: ACTIVE | Noted: 2024-02-14

## 2024-02-20 PROBLEM — I48.91 UNSPECIFIED ATRIAL FIBRILLATION: Chronic | Status: ACTIVE | Noted: 2024-02-14

## 2024-02-20 PROBLEM — I50.30 UNSPECIFIED DIASTOLIC (CONGESTIVE) HEART FAILURE: Chronic | Status: ACTIVE | Noted: 2024-02-14

## 2024-02-20 PROBLEM — I34.0 NONRHEUMATIC MITRAL (VALVE) INSUFFICIENCY: Chronic | Status: ACTIVE | Noted: 2024-02-14

## 2024-02-20 PROBLEM — I10 ESSENTIAL (PRIMARY) HYPERTENSION: Chronic | Status: ACTIVE | Noted: 2024-02-14

## 2024-02-20 RX ORDER — SENNOSIDES 8.6 MG/1
8.6 CAPSULE, GELATIN COATED ORAL
Qty: 10 | Refills: 0 | Status: ACTIVE | COMMUNITY
Start: 2024-02-20 | End: 1900-01-01

## 2024-02-20 RX ORDER — APIXABAN 2.5 MG/1
2.5 TABLET, FILM COATED ORAL
Qty: 60 | Refills: 0 | Status: ACTIVE | COMMUNITY

## 2024-02-20 RX ORDER — AMLODIPINE BESYLATE 10 MG/1
10 TABLET ORAL
Refills: 0 | Status: DISCONTINUED | COMMUNITY
End: 2024-02-20

## 2024-02-20 RX ORDER — LOSARTAN POTASSIUM 50 MG/1
50 TABLET, FILM COATED ORAL
Refills: 0 | Status: DISCONTINUED | COMMUNITY
End: 2024-02-20

## 2024-02-20 RX ORDER — PANTOPRAZOLE 40 MG/1
40 TABLET, DELAYED RELEASE ORAL
Refills: 0 | Status: ACTIVE | COMMUNITY
Start: 2024-02-20

## 2024-02-20 RX ORDER — ATORVASTATIN CALCIUM 10 MG/1
10 TABLET, FILM COATED ORAL
Refills: 0 | Status: ACTIVE | COMMUNITY

## 2024-02-20 NOTE — HISTORY OF PRESENT ILLNESS
[FreeTextEntry1] : Hospital Course: Discharge Date	17-Feb-2024 Admission Date	15-Feb-2024 05:23 Reason for Admission	Aortic Stenosis Hospital Course	 Patient discussed on morning rounds with Dr. Bowling   Operation Date: 2/15: TAVR (27 mm Abbott Ojhn valve)   Primary Surgeon/Attending MD: Dr. Bowling   Referring Physician: Dr. Mainor Patricia _ _ _ _ _ _ _ _ _ _ _ _ HOSPITAL COURSE: 89 Y female with PHM of newly diagnosed AF (on eliquis but experiencing nose bleeds), HTN, HLD, thoracic aneurysm, OA, HFpEF, mod MR, severe TR and severe aortic stenosis (TTE 5/2023: LVEF 60%, MG 43.30, CRYSTAL 1.0) referred for surgical consultation of treatment options for severe aortic stenosis. On 2/15/24 patient underwent a TAVR (27 mm Abbott John valve) with Dr. Bowling. Intra-op the patient's QRS widened and patient arrived to [facility] as a mini-ICU patient with a right groin TVP in place. On POD 1, patient was restarted on cardizem, qrs unchanged, afib rate controlled. TVP was removed. TTE negative for pericardial effusion and heparin gtt changed to Eliquis. On POD 2 Pt complained of pain behind the knee. pain was significant to touch and pt discussed that she was recently diagnosed with a bakers cyst. Per Dr. Jolly, US of the lower extremity ordered to r/o DVT which was negative and showed a bakers cyst. Pt has an out pt Orthopedist who she will see. At time of discharge pt became tachycardic to the 130s, discussed with Dr. Bowling and plan was to give additional dose of cardizem (equaling her home dose of 180) monitor for an hour and then discharge home if HR remains in the low 100s. After an hour HR remained  and pt was deemed medically stable for discharge. At time of discharge there was no significant EKG changes, pt tolerated a PO diet, ambulated with assistance of a walker and had a post op BM. PT was discharged with a MCOT and is to not resume Losartan and spirolactone, started on lasix 20 mg QD. Will need a BMP at follow up due to hyperkalemia. ________________________________________________________________  Today patient was seen via telehealth visit s/p discharge from [facility]. Patient informed they are being followed by [facility] program. Time was spent with the patient reviewing the discharge material including medications, follow up appointments, recovery, concerning symptoms, and how to contact me should they have questions.

## 2024-02-20 NOTE — PLAN
[TextEntry] : Pt advised of importance of daily weights. Pt instructed on how to use incentive spirometer every hour, demonstrated proper use. Pt encouraged to ambulate as much as tolerated, avoiding extreme temperatures outdoors. Also advised to cleanse incisions daily with mild soap and water and to avoid lotions, powders, ointments or creams near or on the incision. Low salt, low fat diet encouraged and discussed. Pt advised to avoid heavy lifting or straining.     Follow Your Heart team will continue to follow up with pt status.  NP/CCC roles explained with pt understanding, contact information provided. Pt agrees to call with any questions, issues or concerns.  Worsening symptoms reviewed with patient understanding.      FOLLOW UP APPOINTMENTS:  CTS: 1 week 2/23  CARDIOLOGIST: 2 weeks  ortho please call for appt.   PCP: Pt encouraged to follow up within one month of discharge

## 2024-02-20 NOTE — ASSESSMENT
[FreeTextEntry1] : Pt recovering well at home s/p TAVR. Patient has supportive daughter present to assist. Patient is asking "when can I drive again" Instructed she must obtain clearance from team. She verbalized understanding.   Reviewed all medications and dosages with pt understanding. Pt dispenses meds appropriately into med dispenser each week. Pt has all medications in home and is taking as prescribed. Reports no BM since dc. Has miralax. Sent senna to pharmacy. Will follow up.   Using walker for ambulation. Reports knee pain has resolved. I encouraged her to call ortho today for a follow up appt.   Denies pain at this time. No new symptoms, issues or concerns to report at this time.   VNS initiated SOC today, home visit conducted. encouraged PT order.   Appt schedule reviewed with pt verbalizing understanding.

## 2024-02-20 NOTE — PHYSICAL EXAM
[Neck Appearance] : the appearance of the neck was normal [Exaggerated Use Of Accessory Muscles For Inspiration] : no accessory muscle use [Examination Of The Chest] : the chest was normal in appearance [Chest Visual Inspection Thoracic Asymmetry] : no chest asymmetry [Diminished Respiratory Excursion] : normal chest expansion [FreeTextEntry2] : Skin clean, dry and healing well. The groin incision had no active bleeding, no foul smell, no purulent drainage and no serosanguineous drainage. [Abnormal Walk] : normal gait [Musculoskeletal - Swelling] : no joint swelling seen [FreeTextEntry1] : patient has compression stockings in place to mid thigh, no swelling or erythema noted to knee joints.  [Skin Color & Pigmentation] : normal skin color and pigmentation [] : no rash [No Focal Deficits] : no focal deficits [Oriented To Time, Place, And Person] : oriented to person, place, and time

## 2024-02-22 ENCOUNTER — APPOINTMENT (OUTPATIENT)
Dept: CARE COORDINATION | Facility: HOME HEALTH | Age: 89
End: 2024-02-22

## 2024-02-22 VITALS
OXYGEN SATURATION: 98 % | HEART RATE: 66 BPM | WEIGHT: 143 LBS | BODY MASS INDEX: 26.16 KG/M2 | RESPIRATION RATE: 18 BRPM | SYSTOLIC BLOOD PRESSURE: 117 MMHG | DIASTOLIC BLOOD PRESSURE: 82 MMHG

## 2024-02-22 RX ORDER — FUROSEMIDE 40 MG/1
40 TABLET ORAL
Qty: 30 | Refills: 0 | Status: ACTIVE | COMMUNITY

## 2024-02-22 RX ORDER — SPIRONOLACTONE 25 MG/1
25 TABLET, FILM COATED ORAL DAILY
Refills: 0 | Status: ACTIVE | COMMUNITY

## 2024-02-22 NOTE — PHYSICAL EXAM
[Sclera] : the sclera and conjunctiva were normal [Jugular Venous Distention Increased] : there was no jugular-venous distention [Respiration, Rhythm And Depth] : normal respiratory rhythm and effort [Exaggerated Use Of Accessory Muscles For Inspiration] : no accessory muscle use [Right Carotid Bruit] : no bruit heard over the right carotid [Left Carotid Bruit] : no bruit heard over the left carotid [Left Femoral Bruit] : no bruit heard over the left femoral artery [Right Femoral Bruit] : no bruit heard over the right femoral artery [2+] : left 2+ [No Abnormalities] : the abdominal aorta was not enlarged and no bruit was heard [Bowel Sounds] : normal bowel sounds [Abdomen Soft] : soft [Abnormal Walk] : normal gait [FreeTextEntry1] : ambulates independently and uses RW at night; BURGESS x4 [Skin Color & Pigmentation] : normal skin color and pigmentation [Skin Turgor] : normal skin turgor [] : no rash [Oriented To Time, Place, And Person] : oriented to person, place, and time [Impaired Insight] : insight and judgment were intact [Affect] : the affect was normal

## 2024-02-22 NOTE — ASSESSMENT
[FreeTextEntry1] : Ms Araujo is recovering well at home s/p TAVR, she is accompanied by her very supportive daughters. Reviewed all medications and dosages with patient understanding. Patient has all medications in home and is taking as prescribed. Pain controlled with current medication regimen. No new symptoms, issues or concerns. Reports ambulating around home independently, without the use if assistive device. Denies chest pain, SOB/BENJAMIN, nausea/vomiting, constipation/diarrhea.   PLAN OF CARE -Increase Lasix from 20mg to 40mg daily, additional 20mg given in home during visit, add Aldactone 25mg in the evening, DO NOT TAKE together to reduce the incidence of hypotension BP today 117/78. K from draw on 2/21 within goal at 4.3, BUN mildly elevated at 27/Cr wnl.   -Shower let water run over incision use antibacterial soap and pat dry; avoid all creams, ointments or lotions leave open to air. Monitor left groin hematoma, do not attempt to squeeze of press firmly on affected site, monitor daily and call immediately for any changes or worsening symptoms.  -Encourage increased ambulation to include outdoors; avoiding extreme temperatures, it will improve circulation and breathing, e-link sent for elevation pillow to help reduce BLE pitting edema by keeping legs elevated.  -Start daily weights today; call immediately for weight gain >3lbs in a day/5lbs in a week.  -Follow up with Cardiologist Dr Patricia on Monday 2/26/24 as discussed, THV with CTS on 2/23/2024, as per Dr Bowling repeat CMP at Cardiology visit. Patient instructed to call if labs are not done at Card, [place] will be scheduled.

## 2024-02-22 NOTE — HISTORY OF PRESENT ILLNESS
[FreeTextEntry1] : [facility]: [facility] Post discharge follow-up and assessment  Operation Date: 2/15: TAVR (27 mm Abbott John valve): Primary Surgeon/Attending MD: Dr. Bowling  Theresa Araujo is a 89 Y female with PHM of newly diagnosed AF (on eliquis but experiencing nose bleeds), HTN, HLD, thoracic aneurysm, OA, HFpEF, mod MR, severe TR and severe aortic stenosis (TTE 5/2023: LVEF 60%, MG 43.30, CRYSTAL 1.0) referred for surgical consultation of treatment options for severe aortic stenosis.   On 2/15/24 patient underwent a TAVR (27 mm Abbott John valve) with Dr. Bowling. Intra-op the patient's QRS widened and patient arrived to [facility] as a mini-ICU patient with a right groin TVP in place. Discharged to home on 2/18/2024.   Seen in home today by [facility], for concerns of 1lb weight gain and an episode of "chest pain" overnight, per patient pain was mid sternum which radiated to her back, lasting approximately 1-2mins, resolved when she "got up" to "use the bathroom", no other instances since. On exam she has 2+ pitting edema to BLE, rhonci in bilateral lower lung fields on auscultation, she is in NAD, ambulating independently no SOB/BENJAMIN, she has been sleeping in a recliner and wearing thigh high compression stockings during the day. Of note h/o HFpEF on Lasix 40mg daily in conjunction with Aldactone 25mg for HF recs.

## 2024-02-23 ENCOUNTER — APPOINTMENT (OUTPATIENT)
Dept: CARDIOTHORACIC SURGERY | Facility: CLINIC | Age: 89
End: 2024-02-23
Payer: MEDICARE

## 2024-02-23 PROCEDURE — 99213 OFFICE O/P EST LOW 20 MIN: CPT

## 2024-02-23 NOTE — ASSESSMENT
[FreeTextEntry1] : 89F with PHM of HTN, HLD, thoracic aneurysm, OA, afib (on Eliquis), HFpEF, mod MR, severe TR and severe aortic stenosis (TTE 5/2023: LVEF 60%, MG 43.30, CRYSTAL 1.0) now s/p L-TF TAVR (27mm Navitor) on 2/15/24, who present for follow up after discharge. The patient is clinically stable; NYHA Class II/II. The patient reports improving symptoms since yesterday with improving volume status since the resumption of her prior dose of her lasix and spironolactone. MCOT reviewed, no high grade AVB event. The ECHO done prior to discharge was reviewed. The patient is scheduled to see her cardiologist, Dr. Patricia on Monday (2/26/25), advised to follow up as scheduled and to obtain follow up BMP to endure stability of her electrolytes and renal function in the setting of recent med adjustment. Reminded patient to avoid routine dental visit/procedure for the next 3 months and that IE Abx ppx prior to any dental procedure is needed indefinitely moving forward; and to continue monitoring her daily weights and BP checks. The patient will return in three weeks with ECHO. All questions were answered.

## 2024-02-23 NOTE — RESULTS/DATA
[TextEntry] : TAVR CTs on 11/3/2023- 4.1 cm aortic root aneurysm. 4.1 cm ascending thoracic aortic aneurysm. Severe atherosclerotic calcifications. Markedly tortuous abdominal aorta with two adjacent 90-degree kinks. 1.2 cm vascular malformation in hepatic segment 2 as discussed above. In addition, there are several hypervascular foci in hepatic segments 6 and 7 which may represent hypervascular liver lesions versus additional vascular malformations. A follow-up MRI with hepatic mass protocol/with and without IV contrast is recommended. Dilated right and left main pulmonary arteries suggesting pulmonary arterial hypertension. Additional findings noted above.  CT Cardiac on 11/3/2023- 1. Stenosis severity is indeterminate in the proximal to mid LAD, proximal LCX and OM1 and mid RCA2. Remaining coronary segments appear normal3. Trileaflet aortic valve with leaflet thickening and calcification  Carotid dopplers on 11/3/2023- IMPRESSION: Calcified plaque without duplex evidence of hemodynamically significant stenosis of either carotid artery.  TTE 2/15/24 CONCLUSIONS:  1. Normal left ventricular size and systolic function.  2. Normal right ventricular size and systolic function.  3. Biatrial enlargement.  4. A transcatheter aortic valve (TAVR) is noted in the aortic position. The peak transvalvular velocity is 1.48 m/s, the mean transvalvular gradient is 5.00 mmHg, and the LVOT/AV velocity ratio is 0.71. There is trace aortic regurgitation. The aortic regurgitation is paravalvular.  5. Moderate tricuspid regurgitation.  6. Mild-to-moderate mitral regurgitation.  7. Pulmonary artery systolic pressure is 69 mmHg.  8. No pericardial effusion.  9. No prior echo is available for comparison.  Limited TTE 2/16/24 CONCLUSIONS:  1. Limited study obtained for evaluation of pericardial effusion.  2. No pericardial effusion.  3. Normal left ventricular size and systolic function.  4. Mildly dilated right ventricular size.  5. Normal right ventricular systolic function.  6. Severe tricuspid regurgitation.  7. 27mm Navitor TAVR valve is seen in the aortic position with normal function, and mild paravalvular regurgitation.

## 2024-02-23 NOTE — HISTORY OF PRESENT ILLNESS
[FreeTextEntry1] : 89F with PHM of HTN, HLD, thoracic aneurysm, OA, afib (on Eliquis), HFpEF, mod MR, severe TR and severe aortic stenosis (TTE 5/2023: LVEF 60%, MG 43.30, CRYSTAL 1.0) now s/p L-TF TAVR (27mm Navitor) on 2/15/24, who present for follow up after discharge.   Hospital Course:  On 2/15/24, patient underwent a LHC that showed non-obstructive CAD (30% mid LAD, other vessels with mild disease), followed by a successful L-TF TAVR using a 27 mm Abbott Navitor THV with Dr. Bowling. Intra-op the patient's QRS widened, and patient arrived to [facility] as a mini-ICU patient with a right groin TVP in place. Post TAVR TTE on 2/15/24 showed normal BiV function, THV valve in aortic position with PV 4.48, MG 5, trace PVL, moderate TR, mold to moderate MR, no pericardial effusion. On POD 1, patient was restarted on Cardizem with stable QRS, Afib was rate controlled. TVP was removed. 2/16/24. Limited TTE on 2/16/24 showed normally functioning THV valve with mild PVL and no pericardial effusion. Patient was transitioned from heparin gtt to Eliquis. On POD 2 Pt complained of pain behind the knee. tender to touch (note: had recent dx of bakers cyst). RLE venous US was negative for DVT, recs was to follow up with outpatient orthopedist. Patient had Afib of 130s on the day of discharge, was given additional Cardizem with improvement in heart rate. On 2/17/24, patient was discharged home with MCOT and on the following medications: Eliquis 2.5mg BID, atorvastatin 10 mg daily, Diltiazem ER 180mg daily, lasix 20 mg daily, pantoprazole and senna. Losartan and Spironolactone were held.   On 2/22/24, [facility] received [facility] notification re: 2 lbs weight gain over the last 24h with chest heaviness. 2/20/24 follow up BMP showed K 4.3 and Cr 0.97, patient's home diuretic regimen were resumed (Lasix 40 mg daily and spironolactone 25mg daily). Patient has an appointment with Dr. Patricia on Monday, 2/26/24 with follow up labs.   MCOT showed Afib with average HR of 90s, but with episodes of HR up to 140s. No high grade AVB noted.   Today, patient reports feeling significantly better compared to the past couple of days, breathing better with resolution of chest heaviness. Resumed her Lasix 40 mg daily and Aldactone 25mg daily yesterday. Slept well last night. Weight is stable at 144 lbs this morning (143.8 on 2/22/24, 142.4 on 2/21/24). She is only walking around the house, has not walked around outside the house. Had a very brief episode of brief lightheadedness when she was taking a hot shower the other day, no further episode. Notes improving LE edema and continues to sleep on her recliner which she has been doing for a while since prior to her valve procedure, unable to lay flat in bed due to shortness of breath.  Denies any recent chest pain, worsening shortness of breath, palpitations, PND, fever, chills or dysuria. She is wearing her MCOT monitor. Endorses bilateral femoral access sites are healing appropriately.

## 2024-02-23 NOTE — PHYSICAL EXAM
Patient given tobacco quitline number 13073099916 at this time, refusing to call at this time, states \" I dont smoke now\"- patient given information as to the dangers of long term tobacco use. Continue to reinforce the importance of tobacco cessation. [TextEntry] : not performed via telehealth

## 2024-02-23 NOTE — PLAN
[TextEntry] : # severe AS s/p L-TF TAVR (27mm Navitor) on 2/15/24 with Dr. Bowling - continue current medication regimen. - continue daily weight and BP monitoring.  - follow up with Dr. Patricia as scheduled for ongoing cardiology care and repeat BMP. - continue IE Abx ppx 30 min to 1 hr prior to dental procedure - return to D clinic with OV with Dr. Bowling in 3 weeks for her 1-month post TAVR follow up and TTE/EKG at Dr. Patricia's office prior to her appointment.

## 2024-02-23 NOTE — REASON FOR VISIT
[Structural Heart and Valve Disease] : structural heart and valve disease [Medical Office: ([facility])___] : at the medical office located in  [Home] : at home, [unfilled] , at the time of the visit. [Family Member] : family member [Patient] : the patient [Self] : self

## 2024-02-23 NOTE — REVIEW OF SYSTEMS
[Negative] : Heme/Lymph [Lower Ext Edema] : lower extremity edema [Orthopnea] : orthopnea [SOB] : no shortness of breath [Dyspnea on exertion] : not dyspnea during exertion [Chest Discomfort] : no chest discomfort [Leg Claudication] : no intermittent leg claudication [Palpitations] : no palpitations [PND] : no PND [Syncope] : no syncope [FreeTextEntry5] : syncope

## 2024-02-26 DIAGNOSIS — I11.0 HYPERTENSIVE HEART DISEASE WITH HEART FAILURE: ICD-10-CM

## 2024-02-26 DIAGNOSIS — I70.0 ATHEROSCLEROSIS OF AORTA: ICD-10-CM

## 2024-02-26 DIAGNOSIS — I25.84 CORONARY ATHEROSCLEROSIS DUE TO CALCIFIED CORONARY LESION: ICD-10-CM

## 2024-02-26 DIAGNOSIS — Z00.6 ENCOUNTER FOR EXAMINATION FOR NORMAL COMPARISON AND CONTROL IN CLINICAL RESEARCH PROGRAM: ICD-10-CM

## 2024-02-26 DIAGNOSIS — Z79.899 OTHER LONG TERM (CURRENT) DRUG THERAPY: ICD-10-CM

## 2024-02-26 DIAGNOSIS — I25.10 ATHEROSCLEROTIC HEART DISEASE OF NATIVE CORONARY ARTERY WITHOUT ANGINA PECTORIS: ICD-10-CM

## 2024-02-26 DIAGNOSIS — I08.3 COMBINED RHEUMATIC DISORDERS OF MITRAL, AORTIC AND TRICUSPID VALVES: ICD-10-CM

## 2024-02-26 DIAGNOSIS — M19.90 UNSPECIFIED OSTEOARTHRITIS, UNSPECIFIED SITE: ICD-10-CM

## 2024-02-26 DIAGNOSIS — T82.03XA LEAKAGE OF HEART VALVE PROSTHESIS, INITIAL ENCOUNTER: ICD-10-CM

## 2024-02-26 DIAGNOSIS — E87.5 HYPERKALEMIA: ICD-10-CM

## 2024-02-26 DIAGNOSIS — E78.5 HYPERLIPIDEMIA, UNSPECIFIED: ICD-10-CM

## 2024-02-26 DIAGNOSIS — I50.32 CHRONIC DIASTOLIC (CONGESTIVE) HEART FAILURE: ICD-10-CM

## 2024-02-26 DIAGNOSIS — Z86.79 PERSONAL HISTORY OF OTHER DISEASES OF THE CIRCULATORY SYSTEM: ICD-10-CM

## 2024-02-26 DIAGNOSIS — M71.21 SYNOVIAL CYST OF POPLITEAL SPACE [BAKER], RIGHT KNEE: ICD-10-CM
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
## 2024-02-26 DIAGNOSIS — Z79.01 LONG TERM (CURRENT) USE OF ANTICOAGULANTS: ICD-10-CM

## 2024-02-26 DIAGNOSIS — I48.91 UNSPECIFIED ATRIAL FIBRILLATION: ICD-10-CM

## 2024-02-28 ENCOUNTER — APPOINTMENT (OUTPATIENT)
Dept: CARE COORDINATION | Facility: HOME HEALTH | Age: 89
End: 2024-02-28

## 2024-02-28 VITALS
DIASTOLIC BLOOD PRESSURE: 60 MMHG | BODY MASS INDEX: 25.06 KG/M2 | HEART RATE: 84 BPM | OXYGEN SATURATION: 98 % | WEIGHT: 137 LBS | RESPIRATION RATE: 14 BRPM | SYSTOLIC BLOOD PRESSURE: 104 MMHG

## 2024-02-28 DIAGNOSIS — I48.20 CHRONIC ATRIAL FIBRILLATION, UNSP: ICD-10-CM

## 2024-02-28 DIAGNOSIS — I10 ESSENTIAL (PRIMARY) HYPERTENSION: ICD-10-CM

## 2024-02-28 DIAGNOSIS — Z95.2 PRESENCE OF PROSTHETIC HEART VALVE: ICD-10-CM

## 2024-02-28 RX ORDER — DILTIAZEM HYDROCHLORIDE 180 MG/1
180 CAPSULE, EXTENDED RELEASE ORAL DAILY
Qty: 30 | Refills: 0 | Status: ACTIVE | COMMUNITY
Start: 2024-02-20 | End: 1900-01-01

## 2024-02-28 NOTE — PLAN
[TextEntry] : Post Operative Care:  Pt advised of importance of daily weights. Pt advised to call [facility] NP with any weight gain of 3 lbs or more.  Pt instructed on how to use incentive spirometer every hour, demonstrated proper use.  Pt encouraged to ambulate as much as tolerated, avoiding extreme temperatures outdoors.  Also advised to cleanse incisions daily with mild soap and water and to avoid lotions, powders, ointments or creams near or on the incision.  Low salt, low fat diet encouraged and discussed.  Pt advised to avoid heavy lifting or straining.     Follow [facility] team will continue to follow up with pt status.  NP/CCC roles explained with pt understanding, contact information provided. Pt agrees to call with any questions, issues or concerns.  Worsening symptoms reviewed with patient understanding.      FOLLOW UP APPOINTMENTS:  CTS: Dr. Bowling, had THV with Mayra last week  CARDIOLOGIST: Dr. Patricia 2/27/24  PCP: Pt encouraged to follow up within one month of discharge

## 2024-02-28 NOTE — ASSESSMENT
[FreeTextEntry1] : S/P TAVR- continue Aldactone, Apixaban, Atorvastatin, Furosemide, and Diltiazem.  Continue MCOT- reviewed directions for use including charging monitor and sensor, changing dressing, and trouble shooting device.

## 2024-02-28 NOTE — REVIEW OF SYSTEMS
[Lower Ext Edema] : lower extremity edema [Negative] : Integumentary [Heart Rate Is Slow] : the heart rate was not slow [Heart Rate Is Fast] : the heart rate was not fast [Chest Pain] : no chest pain [Leg Claudication] : no intermittent leg claudication [Palpitations] : no palpitations

## 2024-02-28 NOTE — HISTORY OF PRESENT ILLNESS
[Hypertension] : Hypertension [Dyslipidemia] : Dyslipidemia [Continuous/Persistent] : Continuous/persistent [A fib / A flutter] : A fib / A flutter [FreeTextEntry1] : 89 Y female with PHM of newly diagnosed AF (on eliquis but experiencing nose bleeds), HTN, HLD, thoracic aneurysm, OA, HFpEF, mod MR, severe TR and severe aortic stenosis (TTE 5/2023: LVEF 60%, MG 43.30, CRYSTAL 1.0) referred for surgical consultation of treatment options for severe aortic stenosis. On 2/15/24 patient underwent a TAVR (27 mm Abbott John valve) with Dr. Bowling. Intra-op the patient's QRS widened and patient arrived to [facility] as a mini-ICU patient with a right groin TVP in place. On POD 1, patient was restarted on cardizem, qrs unchanged, afib rate controlled. TVP was removed. TTE negative for pericardial effusion and heparin gtt changed to Eliquis. On POD 2 Pt complained of pain behind the knee. pain was significant to touch and pt discussed that she was recently diagnosed with a bakers cyst. Per Dr. Jolly, US of the lower extremity ordered to r/o DVT which was negative and showed a bakers cyst. Pt has an out pt Orthopedist who she will see. At time of discharge pt became tachycardic to the 130s, discussed with Dr. Bowling and plan was to give additional dose of cardizem (equaling her home dose of 180) monitor for an hour and then discharge home if HR remains in the low 100s. After an hour HR remained  and pt was deemed medically stable for discharge. At time of discharge there was no significant EKG changes, pt tolerated a PO diet, ambulated with assistance of a walker and had a post op BM. PT was discharged with a MCOT. Mrs. Araujo is recovering at home, her daughter is currently visiting from [place].  Pt is ambulating independently.  She denies dizziness, SOB, palpitations, abdominal pain, constipation, and improvement in LE swelling from last week.  Mrs. DESAI saw Dr. Patricia yesterday and had an echo.

## 2024-02-28 NOTE — PHYSICAL EXAM
[Sclera] : the sclera and conjunctiva were normal [PERRL With Normal Accommodation] : pupils were equal in size, round, and reactive to light [Neck Appearance] : the appearance of the neck was normal [Respiration, Rhythm And Depth] : normal respiratory rhythm and effort [Auscultation Breath Sounds / Voice Sounds] : lungs were clear to auscultation bilaterally [Exaggerated Use Of Accessory Muscles For Inspiration] : no accessory muscle use [Apical Impulse] : the apical impulse was normal [Heart Rate And Rhythm] : heart rate was normal and rhythm regular [Heart Sounds] : normal S1 and S2 [Heart Sounds Gallop] : no gallops [Heart Sounds Pericardial Friction Rub] : no pericardial rub [Murmurs] : no murmurs [Chest Visual Inspection Thoracic Asymmetry] : no chest asymmetry [Diminished Respiratory Excursion] : normal chest expansion [Examination Of The Chest] : the chest was normal in appearance [2+] : left 2+ [Bowel Sounds] : normal bowel sounds [Abdomen Soft] : soft [Abdomen Tenderness] : non-tender [Abnormal Walk] : normal gait [Nail Clubbing] : no clubbing  or cyanosis of the fingernails [Involuntary Movements] : no involuntary movements were seen [Skin Color & Pigmentation] : normal skin color and pigmentation [Skin Turgor] : normal skin turgor [] : no rash [No Focal Deficits] : no focal deficits [Oriented To Time, Place, And Person] : oriented to person, place, and time [Impaired Insight] : insight and judgment were intact [Affect] : the affect was normal [Mood] : the mood was normal [Memory Recent] : recent memory was not impaired [Memory Remote] : remote memory was not impaired [FreeTextEntry1] : B/L groin clean, dry, and intact. B/L LE edema + 1 non pitting.

## 2024-03-01 ENCOUNTER — TRANSCRIPTION ENCOUNTER (OUTPATIENT)
Age: 89
End: 2024-03-01

## 2024-03-03 ENCOUNTER — TRANSCRIPTION ENCOUNTER (OUTPATIENT)
Age: 89
End: 2024-03-03

## 2024-03-19 ENCOUNTER — TRANSCRIPTION ENCOUNTER (OUTPATIENT)
Age: 89
End: 2024-03-19

## 2024-04-15 ENCOUNTER — APPOINTMENT (OUTPATIENT)
Dept: CARDIOTHORACIC SURGERY | Facility: CLINIC | Age: 89
End: 2024-04-15

## 2024-04-17 ENCOUNTER — OFFICE (OUTPATIENT)
Dept: URBAN - METROPOLITAN AREA CLINIC 122 | Facility: CLINIC | Age: 89
Setting detail: OPHTHALMOLOGY
End: 2024-04-17
Payer: MEDICARE

## 2024-04-17 ENCOUNTER — RX ONLY (RX ONLY)
Age: 89
End: 2024-04-17

## 2024-04-17 DIAGNOSIS — H16.223: ICD-10-CM

## 2024-04-17 DIAGNOSIS — H26.493: ICD-10-CM

## 2024-04-17 DIAGNOSIS — H35.363: ICD-10-CM

## 2024-04-17 DIAGNOSIS — H53.19: ICD-10-CM

## 2024-04-17 DIAGNOSIS — H35.033: ICD-10-CM

## 2024-04-17 PROCEDURE — 92004 COMPRE OPH EXAM NEW PT 1/>: CPT | Performed by: OPHTHALMOLOGY

## 2024-04-17 PROCEDURE — 92083 EXTENDED VISUAL FIELD XM: CPT | Performed by: OPHTHALMOLOGY

## 2024-04-17 PROCEDURE — 92250 FUNDUS PHOTOGRAPHY W/I&R: CPT | Performed by: OPHTHALMOLOGY

## 2024-04-18 NOTE — REVIEW OF SYSTEMS
[SOB] : no shortness of breath [Dyspnea on exertion] : not dyspnea during exertion [Chest Discomfort] : no chest discomfort [Lower Ext Edema] : lower extremity edema [Leg Claudication] : no intermittent leg claudication [Palpitations] : no palpitations [Orthopnea] : orthopnea [PND] : no PND [Syncope] : no syncope [Negative] : Heme/Lymph [FreeTextEntry5] : syncope

## 2024-04-18 NOTE — HISTORY OF PRESENT ILLNESS
[FreeTextEntry1] : 89F with PHM of HTN, HLD, thoracic aneurysm, OA, afib (on Eliquis), HFpEF, mod MR, severe TR and severe aortic stenosis (TTE 5/2023: LVEF 60%, MG 43.30, CRYSTAL 1.0) now s/p L-TF TAVR (27mm Navitor) on 2/15/24, who present for one month follow up.   Since her last visit, the patient states  ECHO on 04/5/2024 showed the Navitor valve functioning well with trace PVL

## 2024-04-19 ENCOUNTER — APPOINTMENT (OUTPATIENT)
Dept: CARDIOTHORACIC SURGERY | Facility: CLINIC | Age: 89
End: 2024-04-19
Payer: MEDICARE

## 2024-04-19 VITALS
HEART RATE: 89 BPM | HEIGHT: 62 IN | WEIGHT: 137 LBS | DIASTOLIC BLOOD PRESSURE: 60 MMHG | BODY MASS INDEX: 25.21 KG/M2 | SYSTOLIC BLOOD PRESSURE: 120 MMHG | TEMPERATURE: 98.7 F | OXYGEN SATURATION: 98 %

## 2024-04-19 LAB
HCT VFR BLD CALC: 37.9 %
HGB BLD-MCNC: 12.2 G/DL
MCHC RBC-ENTMCNC: 30 PG
MCHC RBC-ENTMCNC: 32.2 GM/DL
MCV RBC AUTO: 93.1 FL
PLATELET # BLD AUTO: 209 K/UL
RBC # BLD: 4.07 M/UL
RBC # FLD: 15 %
WBC # FLD AUTO: 5.16 K/UL

## 2024-04-19 PROCEDURE — 99213 OFFICE O/P EST LOW 20 MIN: CPT

## 2024-04-20 LAB
ALBUMIN SERPL ELPH-MCNC: 4.7 G/DL
ALP BLD-CCNC: 98 U/L
ALT SERPL-CCNC: 25 U/L
ANION GAP SERPL CALC-SCNC: 13 MMOL/L
AST SERPL-CCNC: 28 U/L
BILIRUB SERPL-MCNC: 0.7 MG/DL
BUN SERPL-MCNC: 32 MG/DL
CALCIUM SERPL-MCNC: 9.8 MG/DL
CHLORIDE SERPL-SCNC: 100 MMOL/L
CO2 SERPL-SCNC: 26 MMOL/L
CREAT SERPL-MCNC: 1.61 MG/DL
EGFR: 30 ML/MIN/1.73M2
GLUCOSE SERPL-MCNC: 116 MG/DL
POTASSIUM SERPL-SCNC: 4.3 MMOL/L
PROT SERPL-MCNC: 6.5 G/DL
SODIUM SERPL-SCNC: 138 MMOL/L

## 2024-06-19 ENCOUNTER — OFFICE (OUTPATIENT)
Dept: URBAN - METROPOLITAN AREA CLINIC 122 | Facility: CLINIC | Age: 89
Setting detail: OPHTHALMOLOGY
End: 2024-06-19
Payer: MEDICARE

## 2024-06-19 DIAGNOSIS — H26.493: ICD-10-CM

## 2024-06-19 DIAGNOSIS — H53.19: ICD-10-CM

## 2024-06-19 DIAGNOSIS — H35.033: ICD-10-CM

## 2024-06-19 DIAGNOSIS — H35.363: ICD-10-CM

## 2024-06-19 DIAGNOSIS — H16.223: ICD-10-CM

## 2024-06-19 PROCEDURE — 92134 CPTRZ OPH DX IMG PST SGM RTA: CPT | Performed by: OPHTHALMOLOGY

## 2024-06-19 PROCEDURE — 99213 OFFICE O/P EST LOW 20 MIN: CPT | Performed by: OPHTHALMOLOGY

## 2024-06-19 ASSESSMENT — CONFRONTATIONAL VISUAL FIELD TEST (CVF)
OS_FINDINGS: FULL
OD_FINDINGS: FULL

## 2024-06-26 ENCOUNTER — RESULT REVIEW (OUTPATIENT)
Age: 89
End: 2024-06-26

## 2024-06-28 ENCOUNTER — RESULT REVIEW (OUTPATIENT)
Age: 89
End: 2024-06-28

## 2024-06-29 ENCOUNTER — RESULT REVIEW (OUTPATIENT)
Age: 89
End: 2024-06-29

## 2024-07-01 ENCOUNTER — RESULT REVIEW (OUTPATIENT)
Age: 89
End: 2024-07-01

## 2024-07-03 ENCOUNTER — TRANSCRIPTION ENCOUNTER (OUTPATIENT)
Age: 89
End: 2024-07-03

## 2024-07-26 ENCOUNTER — TRANSCRIPTION ENCOUNTER (OUTPATIENT)
Age: 89
End: 2024-07-26

## 2025-04-02 ENCOUNTER — RESULT REVIEW (OUTPATIENT)
Age: 89
End: 2025-04-02

## 2025-04-04 ENCOUNTER — RESULT REVIEW (OUTPATIENT)
Age: 89
End: 2025-04-04

## 2025-04-04 ENCOUNTER — TRANSCRIPTION ENCOUNTER (OUTPATIENT)
Age: 89
End: 2025-04-04

## 2025-04-04 ENCOUNTER — APPOINTMENT (OUTPATIENT)
Dept: CARDIOTHORACIC SURGERY | Facility: CLINIC | Age: 89
End: 2025-04-04

## 2025-04-08 ENCOUNTER — TRANSCRIPTION ENCOUNTER (OUTPATIENT)
Age: 89
End: 2025-04-08

## 2025-04-08 ENCOUNTER — RESULT REVIEW (OUTPATIENT)
Age: 89
End: 2025-04-08

## 2025-04-17 NOTE — PHYSICAL THERAPY INITIAL EVALUATION ADULT - ORIENTATION, REHAB EVAL
Returned call to patient.  Two patient identifiers used.    Gave pt number to Central Scheduling to schedule CT Scan.    oriented to person, place, time and situation

## 2025-04-21 ENCOUNTER — OFFICE (OUTPATIENT)
Dept: URBAN - METROPOLITAN AREA CLINIC 122 | Facility: CLINIC | Age: OVER 89
Setting detail: OPHTHALMOLOGY
End: 2025-04-21
Payer: MEDICARE

## 2025-04-21 DIAGNOSIS — H26.493: ICD-10-CM

## 2025-04-21 DIAGNOSIS — H52.4: ICD-10-CM

## 2025-04-21 DIAGNOSIS — H53.19: ICD-10-CM

## 2025-04-21 DIAGNOSIS — H35.363: ICD-10-CM

## 2025-04-21 DIAGNOSIS — H16.223: ICD-10-CM

## 2025-04-21 DIAGNOSIS — H35.033: ICD-10-CM

## 2025-04-21 PROBLEM — H52.7 REFRACTIVE ERROR: Status: ACTIVE | Noted: 2025-04-21

## 2025-04-21 PROCEDURE — 92014 COMPRE OPH EXAM EST PT 1/>: CPT | Performed by: OPHTHALMOLOGY

## 2025-04-21 PROCEDURE — 92015 DETERMINE REFRACTIVE STATE: CPT | Performed by: OPHTHALMOLOGY

## 2025-04-21 PROCEDURE — 92250 FUNDUS PHOTOGRAPHY W/I&R: CPT | Performed by: OPHTHALMOLOGY

## 2025-04-21 ASSESSMENT — TEAR BREAK UP TIME (TBUT)
OD_TBUT: 2+
OS_TBUT: 2+

## 2025-04-21 ASSESSMENT — CONFRONTATIONAL VISUAL FIELD TEST (CVF)
OS_FINDINGS: FULL
OD_FINDINGS: FULL

## 2025-04-21 ASSESSMENT — TONOMETRY
OD_IOP_MMHG: 17
OS_IOP_MMHG: 17

## 2025-04-22 ASSESSMENT — REFRACTION_MANIFEST
OD_SPHERE: -0.25
OD_CYLINDER: -1.00
OS_ADD: +2.50
OD_ADD: +2.50
OS_CYLINDER: -0.75
OD_VA1: 20/20
OS_SPHERE: PLANO
OS_VA1: 20/25+
OS_AXIS: 85
OD_AXIS: 55

## 2025-04-22 ASSESSMENT — REFRACTION_AUTOREFRACTION
OS_CYLINDER: -0.75
OD_AXIS: 69
OS_SPHERE: PLANO
OD_SPHERE: +0.25
OS_AXIS: 93
OD_CYLINDER: -1.25

## 2025-04-22 ASSESSMENT — VISUAL ACUITY
OS_BCVA: 20/25-2
OD_BCVA: 20/25

## 2025-07-21 NOTE — REVIEW OF SYSTEMS
Bertha Hammond MD, FAAP, [facility]  [phone]    Minor Procedure post-operative instructions    You have had a minor surgical procedure. Please follow these simple instructions to help ensure a safe and comfortable recovery. Any questions can be directed to the office at [phone].    Bandages:  If bandages were placed, remove them 1-2 day(s) after surgery.  If skin glue was used to cover your incisions, there might not be any bandages that require removal.    Expect a modest amount of redness (inflammation) and possibly some bruising to appear in the days following your surgery. This is normal and will resolve as the wound heals.  The appearance of excessive wound redness, pus or fever is not normal and should be reported to the office.    Bathing:  [ x ] You may shower 1-2 day(s) after surgery.  You may shampoo your hair and may use soap for your skin.  No tub baths or swimming for one week.  Remove any bandages before showering.    [ x ]  Antibiotic ointment (such as bacitracin- this was given to you.) should be lightly applied 1-2 times per day to forehead sutures.  If the incision is near the eye, you may be given an ophthalmic ointment to use.    [ x ]  Suture removal: You have sutures that need to be removed.  Please call to schedule and appointment in 7 days.    Pain:  Mild to moderate pain is to be expected after minor surgery and will generally be relieved by the use of Tylenol or ibuprofen agents (Advil, Motrin, Nuprin, etc.). You can also use an icepack 20 minutes on and 20 minutes off.    Swelling or discomfort will be improved by elevating the surgical site above the level of the heart, especially the face, scalp or arms, which can be elevated in bed by extra pillows.    Activity:  Please observe the following restrictions until you are cleared by your surgeon.  [ x ]  No heavy lifting greater than 10 pounds or strenuous activity.  [ x ]  Other:  ____No bending  [SOB] : shortness of breath [Dyspnea on exertion] : dyspnea during exertion [Negative] : Heme/Lymph [FreeTextEntry5] : syncope

## (undated) DEVICE — SUT PROLENE 6-0 30" RB-2

## (undated) DEVICE — TUBING EXTENSION HI PRESSURE FLEX 48"

## (undated) DEVICE — CATH CV TRAY INSR ST UNIV

## (undated) DEVICE — BAND RADIAL COMPRESSION DEVICE REG 24CM

## (undated) DEVICE — SUT VICRYL 2-0 27" CT-1

## (undated) DEVICE — SYR MED A2000 SYRINGE KIT ACIST REUS

## (undated) DEVICE — SUT PROLENE 3-0 36" SH-1

## (undated) DEVICE — PACK PROC CV DRAPE

## (undated) DEVICE — SUMP INTRACARDIAC/PERICARDIAL 20FR 1/4" ADULT

## (undated) DEVICE — DRAPE PROBE COVER 5" X 96"

## (undated) DEVICE — DRSG MEPILEX 10 X 25CM (4 X 10") AG

## (undated) DEVICE — SUT TICRON 2-0 36" CV-316 DA

## (undated) DEVICE — DRAPE SURGICAL #1010

## (undated) DEVICE — MARKING PEN W RULER

## (undated) DEVICE — SUT STAINLESS STEEL 6 4-18" CCS

## (undated) DEVICE — SUT PROLENE 5-0 24" RB-1

## (undated) DEVICE — CATH CARDIAC RT CUSET 601201319

## (undated) DEVICE — Device

## (undated) DEVICE — TOURNIQUET SET 12FR (1 RED, 1 BLUE, 3 CLEAR, 1 SNARE) 7"

## (undated) DEVICE — NDL PERCU ECHOTIP 21G X 4CM

## (undated) DEVICE — DRAPE OR CAMERA COVER

## (undated) DEVICE — SUT PLEDGET 9MM X 4MM X 1.5MM

## (undated) DEVICE — SUT SILK 2-0 18" SH (POP-OFF)

## (undated) DEVICE — SUT STAINLESS STEEL 7 4-18" CCS

## (undated) DEVICE — SUT ETHIBOND 3-0 36" RB-1

## (undated) DEVICE — PREP SCRUB BRUSH W CHG 4%

## (undated) DEVICE — WARMING BLANKET FULL UNDERBODY

## (undated) DEVICE — CATH NG SALEM SUMP 16FR

## (undated) DEVICE — SUT VICRYL 4-0 18" PS-2 UNDYED

## (undated) DEVICE — FOLEY TRAY 16FR 5CC LF LUBRISIL ADVANCE TEMP CLOSED

## (undated) DEVICE — SUT HOLDER INSERT FOR OCTOBASE STERNAL RETRACTOR

## (undated) DEVICE — SUT NUROLON 1 18" OS-8 (POP-OFF)

## (undated) DEVICE — SUT SILK 5-0 60" TIES

## (undated) DEVICE — TUBING SUCTION NONCONDUCTIVE 6MM X 12FT

## (undated) DEVICE — DRAPE SLUSH / WARMER 44 X 66"

## (undated) DEVICE — PACK OPEN HEART LNX

## (undated) DEVICE — SUT PROLENE 4-0 36" RB-1

## (undated) DEVICE — DRAPE IOBAN 33" X 23"

## (undated) DEVICE — CHEST DRAIN PLEUR-EVAC DRY/WET ADULT-PEDS SINGLE (QUICK)

## (undated) DEVICE — DRSG BIOPATCH DISK W CHG 1" W 4.0MM HOLE

## (undated) DEVICE — PACING CABLE (BROWN) A/V TEMP SCREW DOWN 12FT

## (undated) DEVICE — RIGID ADULT SUCKER

## (undated) DEVICE — ELCTR ZOLL DEFIBRILLATOR PAD NO REPLACEMENT

## (undated) DEVICE — SUT DOUBLE 6 WIRE STERNAL

## (undated) DEVICE — SUT VICRYL 0 27" CT

## (undated) DEVICE — MANIFOLD ANGIO AUTOMD TRNDCR

## (undated) DEVICE — SUT VICRYL 1 36" CTX UNDYED

## (undated) DEVICE — PACK HYBRID LHH

## (undated) DEVICE — DRAPE ULTRASOUND TRANSDUCER COVER

## (undated) DEVICE — DRSG TRACH DRAINAGE 4X4

## (undated) DEVICE — POSITIONER FOAM EGG CRATE ULNAR 2PCS (PINK)

## (undated) DEVICE — SUT PLEDGET SOFT MEDIUM 1/4" X 1/8" X 1/16" X6

## (undated) DEVICE — SYS DEL CONTROLLER ANGIOTOUCH